# Patient Record
Sex: MALE | Race: WHITE | Employment: FULL TIME | URBAN - METROPOLITAN AREA
[De-identification: names, ages, dates, MRNs, and addresses within clinical notes are randomized per-mention and may not be internally consistent; named-entity substitution may affect disease eponyms.]

---

## 2018-01-02 ENCOUNTER — OFFICE VISIT (OUTPATIENT)
Dept: FAMILY MEDICINE CLINIC | Age: 48
End: 2018-01-02

## 2018-01-02 VITALS
RESPIRATION RATE: 16 BRPM | DIASTOLIC BLOOD PRESSURE: 82 MMHG | HEIGHT: 68 IN | OXYGEN SATURATION: 96 % | SYSTOLIC BLOOD PRESSURE: 114 MMHG | TEMPERATURE: 98.1 F | BODY MASS INDEX: 33.34 KG/M2 | WEIGHT: 220 LBS | HEART RATE: 99 BPM

## 2018-01-02 DIAGNOSIS — J06.9 UPPER RESPIRATORY INFECTION, ACUTE: ICD-10-CM

## 2018-01-02 DIAGNOSIS — H66.001 ACUTE SUPPURATIVE OTITIS MEDIA OF RIGHT EAR WITHOUT SPONTANEOUS RUPTURE OF TYMPANIC MEMBRANE, RECURRENCE NOT SPECIFIED: Primary | ICD-10-CM

## 2018-01-02 DIAGNOSIS — Z20.89 EXPOSURE TO PNEUMONIA: ICD-10-CM

## 2018-01-02 DIAGNOSIS — Z23 NEED FOR INFLUENZA VACCINATION: ICD-10-CM

## 2018-01-02 PROCEDURE — 90471 IMMUNIZATION ADMIN: CPT | Performed by: INTERNAL MEDICINE

## 2018-01-02 PROCEDURE — 99213 OFFICE O/P EST LOW 20 MIN: CPT | Performed by: INTERNAL MEDICINE

## 2018-01-02 PROCEDURE — 90688 IIV4 VACCINE SPLT 0.5 ML IM: CPT | Performed by: INTERNAL MEDICINE

## 2018-01-02 RX ORDER — AZITHROMYCIN 250 MG/1
TABLET, FILM COATED ORAL
Qty: 1 PACKET | Refills: 0 | Status: SHIPPED | OUTPATIENT
Start: 2018-01-02 | End: 2018-01-12

## 2018-01-02 NOTE — PATIENT INSTRUCTIONS
closely for changes in your health, and be sure to contact your doctor if:  ? · You have new or worse symptoms. ? · You are not getting better after taking an antibiotic for 2 days. Where can you learn more? Go to https://osbaldo.Driverdo. org and sign in to your [employer] account. Enter U961 in the [facility] box to learn more about \"Ear Infection (Otitis Media): Care Instructions. \"     If you do not have an account, please click on the \"Sign Up Now\" link. Current as of: May 12, 2017  Content Version: 11.4  © 3698-3421 [employer]. Care instructions adapted under license by [facility] ([facility]). If you have questions about a medical condition or this instruction, always ask your healthcare professional. [address] any warranty or liability for your use of this information. Patient Education        Upper Respiratory Infection (Cold): Care Instructions  Your Care Instructions    An upper respiratory infection, or URI, is an infection of the nose, sinuses, or throat. URIs are spread by coughs, sneezes, and direct contact. The common cold is the most frequent kind of URI. The flu and sinus infections are other kinds of URIs. Almost all URIs are caused by viruses. Antibiotics won't cure them. But you can treat most infections with home care. This may include drinking lots of fluids and taking over-the-counter pain medicine. You will probably feel better in 4 to 10 days. The doctor has checked you carefully, but problems can develop later. If you notice any problems or new symptoms, get medical treatment right away. Follow-up care is a key part of your treatment and safety. Be sure to make and go to all appointments, and call your doctor if you are having problems. It's also a good idea to know your test results and keep a list of the medicines you take. How can you care for yourself at home?   · To prevent dehydration, drink plenty of fluids, enough so

## 2018-01-02 NOTE — PROGRESS NOTES
11/15/2016    LABVLDL 30 03/23/2015    LABVLDL 51 02/11/2014       Old labs and records reviewed or requested  Discussed past lab and studies with patient     1. Acute suppurative otitis media of right ear without spontaneous rupture of tympanic membrane, recurrence not specified     2. Need for influenza vaccination  INFLUENZA, QUADV, 3 YRS AND OLDER, IM, MDV, 0.5ML (FLUZONE QUADV)   3. Upper respiratory infection, acute     4. Exposure to pneumonia       Exposure to pneumonia with probable infectious etiology for illness. Zithromax, prednisone. May use decongestant. Plenty of fluids. Follow-up if not improving      Follow-up when necessary      Diagnosis and treatment discussed.   Possible side effects of medication reviewed  Patients questions answered  Follow up understood  Pt aware if they are not contacted about any test results , this does not mean they are normal.  They should call

## 2019-04-02 ENCOUNTER — OFFICE VISIT (OUTPATIENT)
Dept: ORTHOPEDIC SURGERY | Age: 49
End: 2019-04-02
Payer: COMMERCIAL

## 2019-04-02 VITALS
DIASTOLIC BLOOD PRESSURE: 87 MMHG | WEIGHT: 220 LBS | HEIGHT: 68 IN | RESPIRATION RATE: 12 BRPM | BODY MASS INDEX: 33.34 KG/M2 | SYSTOLIC BLOOD PRESSURE: 135 MMHG | HEART RATE: 87 BPM

## 2019-04-02 DIAGNOSIS — S83.92XA SPRAIN OF LEFT KNEE, UNSPECIFIED LIGAMENT, INITIAL ENCOUNTER: ICD-10-CM

## 2019-04-02 DIAGNOSIS — M25.562 ACUTE PAIN OF LEFT KNEE: Primary | ICD-10-CM

## 2019-04-02 PROCEDURE — 99203 OFFICE O/P NEW LOW 30 MIN: CPT | Performed by: ORTHOPAEDIC SURGERY

## 2019-04-02 ASSESSMENT — ENCOUNTER SYMPTOMS
ALLERGIC/IMMUNOLOGIC NEGATIVE: 1
RESPIRATORY NEGATIVE: 1
GASTROINTESTINAL NEGATIVE: 1
EYES NEGATIVE: 1

## 2019-04-02 NOTE — PROGRESS NOTES
bilateral hips reveals normal flexion and extension. There is no restriction in rotation. There is no tenderness to palpation anteriorly posteriorly or laterally. Right knee examination demonstrates no effusion. There is no tenderness to palpation over the medial or lateral joint line. There is no discomfort over the patellar tendon. There is no palpable popliteal cyst.  Sensation is intact. Range of motion is normal.  There is no patellofemoral crepitation. There is no instability to varus or  valgus stress applied at 0, 30, 60, or 90° of flexion. There is no anterior or posterior drawer. Lachman examination is normal.    Examination has a trace effusion. He has intense tenderness over the medial femoral condyle. He has 1+ opening over the MCL with moderate pain. He has moderate medial joint line tenderness and pain with Mich's maneuver. Calf is soft. ACL and PCL and LCL are intact. He has severe pain at terminal extension and lacks about 5° of extension but flexes normally 235°. Left knee x-rays from [facility] demonstrate:No acute osseous process. Minimal osteoarthritic changes with trace knee joint effusion      Assessment:      Work-related left knee injury with differential diagnosis including medial collateral ligament injury, medial meniscus tear, or bone bruise      Plan:      Recommending an MRI scan. He continue to work on light duty limit of no more than 4 hours per day and must rest 15 minutes out of every hour. Follow-up with me after the scan. This note was created using voice recognition software. It has been proofread, but occasionally errors remain. Please disregard these errors. They will be corrected as they are noted.

## 2019-04-09 ENCOUNTER — OFFICE VISIT (OUTPATIENT)
Dept: ORTHOPEDIC SURGERY | Age: 49
End: 2019-04-09
Payer: COMMERCIAL

## 2019-04-09 VITALS
DIASTOLIC BLOOD PRESSURE: 90 MMHG | BODY MASS INDEX: 33.34 KG/M2 | HEIGHT: 68 IN | WEIGHT: 220 LBS | SYSTOLIC BLOOD PRESSURE: 129 MMHG | HEART RATE: 83 BPM | RESPIRATION RATE: 12 BRPM

## 2019-04-09 DIAGNOSIS — S83.412A SPRAIN OF MEDIAL COLLATERAL LIGAMENT OF LEFT KNEE, INITIAL ENCOUNTER: ICD-10-CM

## 2019-04-09 DIAGNOSIS — M25.562 ACUTE PAIN OF LEFT KNEE: Primary | ICD-10-CM

## 2019-04-09 PROCEDURE — 99213 OFFICE O/P EST LOW 20 MIN: CPT | Performed by: ORTHOPAEDIC SURGERY

## 2019-04-09 PROCEDURE — L1812 KO ELASTIC W/JOINTS PRE OTS: HCPCS | Performed by: ORTHOPAEDIC SURGERY

## 2019-04-09 RX ORDER — NAPROXEN 500 MG/1
500 TABLET ORAL 2 TIMES DAILY WITH MEALS
Qty: 60 TABLET | Refills: 2 | Status: SHIPPED | OUTPATIENT
Start: 2019-04-09 | End: 2019-07-08

## 2019-04-09 NOTE — PROGRESS NOTES
Kita Marcos turns today for his left knee. He's feeling okay this morning yesterday was having lots of pain. Today pain is 2 out of 10. Yesterday pain was 8 out of 10. His pain is typically medial.        Patient's medications, allergies, past medical, surgical, social and family histories were reviewed and updated as appropriate. Relevant review of systems reviewed. General Exam:    Vitals: Blood pressure (!) 129/90, pulse 83, resp. rate 12, height 5' 8\" (1.727 m), weight 220 lb (99.8 kg). Constitutional: Patient is adequately groomed with no evidence of malnutrition  Mental Status: The patient is oriented to time, place and person. The patient's mood and affect are appropriate. Left knee has tenderness over the proximal MCL. He has prominence over the kneecap but that's chronic. He has no intra-articular effusion today. He has no lateral sided pain. He has no gross instability. Range of motion is full. Left knee MRI is reviewed. It demonstrates:  CONCLUSION:   1. Moderate-grade (grade 2) sprain of the proximal aspect of the medial collateral ligament    (MCL) which is partially torn without full-thickness tear/rupture. 2. A 2.2cm in greatest diameter ossification is present along the anteroinferior aspect of the    patella within the proximal patellar tendon, possibly representing a chronic nonunited fracture    fragment or a fractured enthesophyte. Additionally, interposed within the ossification of the    anteroinferior aspect of the patella is tendinosis and probable partial-thickness interstitial    tearing of the adjacent proximal patellar tendon. 3. Small superficial partial-thickness chondral fissure/flap within the patellofemoral    compartment involving the lateral patellar facet adjacent to the patellar apex. 4. Intrasubstance degeneration within the medial and to a lesser degree the lateral meniscus    without evidence of discrete meniscal tear.    5. Small Baptiste's cyst. I reviewed these findings on the report and the images with the patient. Assessment: Work-related moderate grade 2 sprain of the MCL. His irregular ossification along the patella is chronic. That is not work-related. Assessment: Left knee MCL sprain. Plan: He'll go into a brace today that he needs to wear for work. Prescription for Naprosyn was provided as well. He will start therapy. He will work restrictions of 4 hours per day with a 15 minute rest break every hour for the next month. Follow-up with me at that time. This note was created using voice recognition software. It has been proofread, but occasionally errors remain. Please disregard these errors. They will be corrected as they are noted. Procedures    Breg Economy Hinged Knee WrapAround Brace     Patient was prescribed a Breg Economy Hinged Wrap Around Knee Brace. The left knee will require stabilization / immobilization from this semi-rigid / rigid orthosis to improve their function. The orthosis will assist in protecting the affected area, provide functional support and facilitate healing. The patient was educated and fit by a healthcare professional with expert knowledge and specialization in brace application while under the direct supervision of the treating physician. Verbal and written instructions for the use of and application of this item were provided. They were instructed to contact the office immediately should the brace result in increased pain, decreased sensation, increased swelling or worsening of the condition.

## 2019-04-09 NOTE — LETTER
[address] [place] [address] Road  [address] [place] [address] [place] [address]  Phone: [phone]  Fax: [phone]    Walter Lim MD        April 9, 2019     Patient: Rocael Son   YOB: 1970   Date of Visit: 4/9/2019       To Whom It May Concern: It is my medical opinion that Shanna Fleming may return to work on 04-09-19 with the following restrictions: can only work 4 hours per day. Must sit for 15 minutes out of every hour. If you have any questions or concerns, please don't hesitate to call.     Sincerely,        Walter Lim MD

## 2019-04-23 ENCOUNTER — TELEPHONE (OUTPATIENT)
Dept: ORTHOPEDIC SURGERY | Age: 49
End: 2019-04-23

## 2019-04-23 NOTE — TELEPHONE ENCOUNTER
Called patient and left message physical therapy has been approved by [facility]. Please call [phone] and schedule an appointment.

## 2019-05-01 ENCOUNTER — HOSPITAL ENCOUNTER (OUTPATIENT)
Dept: PHYSICAL THERAPY | Age: 49
Setting detail: THERAPIES SERIES
Discharge: HOME | End: 2019-05-01
Payer: COMMERCIAL

## 2019-05-01 PROCEDURE — 97110 THERAPEUTIC EXERCISES: CPT | Performed by: PHYSICAL THERAPIST

## 2019-05-01 PROCEDURE — 97530 THERAPEUTIC ACTIVITIES: CPT | Performed by: PHYSICAL THERAPIST

## 2019-05-01 PROCEDURE — 97161 PT EVAL LOW COMPLEX 20 MIN: CPT | Performed by: PHYSICAL THERAPIST

## 2019-05-01 NOTE — PLAN OF CARE
[address], [address] [place] [address], [address] [place] [address]     Phone: [phone]   Fax: [phone]                                                           Physical Therapy Certification    Dear Referring Practitioner: Sidney Mayers,    We had the pleasure of evaluating the following patient for physical therapy services at [address] [place] [address]. A summary of our findings can be found in the initial assessment below. This includes our plan of care. If you have any questions or concerns regarding these findings, please do not hesitate to contact me at the office phone number checked above. Thank you for the referral.       Physician Signature:_______________________________Date:__________________  By signing above (or electronic signature), therapists plan is approved by physician      Patient: Rocael Lloyd   : 1970   MRN: [id number]  Referring Physician: Referring Practitioner: Sidney Mayers      Evaluation Date: 2019      Medical Diagnosis Information:  Diagnosis: H78.186E (ICD-10-CM) - Sprain of medial collateral ligament of left knee, initial encounter   Treatment Diagnosis: S83.412A (ICD-10-CM) - Sprain of medial collateral ligament of left knee, initial encounter                                           Precautions/ Contra-indications: none  Latex Allergy:  ?NO      ? YES  Preferred Language for Healthcare:   ?English       ? other:    SUBJECTIVE: Patient stated complaint: Pt was going to the [employer] [employer], out of a dock. He hit the bumper of the truck, slipped off. His right leg landed on the ground and his left leg stayed on the dock. It was rainy and dark. This happened . He felt that he was just sore. He worked the rest of the day. It hurt pretty bad. First he went to Urgent Care ([facility] They sent him for an x-ray. He f/u and was then sent to Dr. Sidney Mayers.  He had an MRI done as well.   He then was sent to PT and put in a stabilizer brace. The brace has been helpful. When he is walking for a long period, it's hard to bend/sit. He has problems with prolonged sitting and then going to get up. He is working 4 hrs/d mostly with breaks in the hour. Occ he will work a full 6-7 hrs. Relevant Medical History:none. Allergic to codine. Functional Scale:   [id number]    Pain Scale: 2/10  Easing factors: pain at best 1-2/10. Brace, ice, naproxen  Provocative factors: pain at worst is 6-7/10 after long periods of time. Kneeling and getting down on the ground. Type: ?xConstant   ? Intermittent  ? Radiating ? Localized ? other:     Numbness/Tingling: none    Functional Limitations/Impairments: ?xSitting ? xStanding-3-4 hrs then goes to sit down ? Walking    ? xSquatting/bending (3-6/10)  ? xStairs  (2/10)         ? ADL's  ? Transfers ? xSports/Recreation- 2x/wk (18 on Sunday and 9 on Monday) ? Other:    Occupation/School: [employer]     Living Status/Prior Level of Function: Independent with ADLs and IADLs, golf 2x/wk (18 on Sunday and 9 on Monday)  (insert highest prior level of function)    OBJECTIVE:     Joint mobility:    ?xNormal    ?Hypo   ? Hyper    Palpation: -TTP    Functional Mobility/Transfers: see above    Posture: fwd head    Bandages/Dressings/Incisions: NA    Gait: (include devices/WB status) decreased swing ROM with brace on        Flexibility L R Comment   Hamstring      Gastroc      ITB      Quad                ROM PROM AROM Overpressure Comment    L R L R L R    Flexion   140 \"can feel it\" 143      Extension   lacking 2 0                              Strength L R Comment   Quad 4+ 5    Hamstring 4+ 5    Gastroc      Hip flexor 4 4    Hip ABD 4+ 4+                  Orthopedic Special Tests:   Special Test Results/Comment   Meniscal Click    Crepitus    Flexion Test    Valgus Laxity    Varus Laxity    Lachmans    Drop Back    Homans            Girth L R   Mid Patella     Suprapatellar with diagnosis of left knee sprain (MCL grade 2 per MRI) from the MD.  Clinically, the pt presents with decreased ROM, decreased strength, decreased function, and increased pain consistent with the MD diagnosis. The pt would benefit from skilled PT to return to PLOF. Pt is work comp and did just get visits approved. Pt does have more symptoms with more prolonged positions and tends to walk with decreased swing on his left. Pt's main limitations are being able to be on his feet for long periods of time and doing full work duty. Pt's benefits were reviewed. Pt's questions were answered. Pt was agreeable. Prognosis/Rehab Potential:      ?Excellent   ?xGood    ? Fair   ? Poor    Tolerance of evaluation/treatment:    ?Excellent   x? Good    ? Fair   ? Poor    PLAN  Frequency/Duration:  2 days per week for 6 Weeks:  Interventions:  ?  Therapeutic exercise including: strength training, ROM, for Lower extremity and core   ? NMR activation and proprioception for LE, Glutes and Core   ? Manual therapy as indicated for LE, Hip and spine to include: Dry Needling/IASTM, STM, PROM, Gr I-IV mobilizations, manipulation. ? Modalities as needed that may include: thermal agents, E-stim, Biofeedback, US, iontophoresis as indicated  ? Patient education on joint protection, postural re-education, activity modification, progression of HEP. HEP instruction: (see scanned forms)    GOALS:  Patient stated goal: no pain    Therapist goals for Patient:   Short Term Goals: To be achieved in: 2 weeks  1. Pt will be independent HEP and progression per patient tolerance, in order to prevent re-injury. 2. Patient will have a decrease in pain of 5/10 at worst to facilitate improvement in movement, function, and ADLs as indicated by functional deficits. Long Term Goals: To be achieved in: 6 weeks  1. Pt will demo a WOMAC score of 5% or better to assist with reaching prior level of function.    2. Patient will demonstrate increased AROM to knee flexion to greater than or equal to 140 and knee ext to 0 to allow for proper joint functioning as indicated by patients functional deficits. 3. Patient will demonstrate an increase in strength of hip flex, ABD, and knee flex/ext to 4+/5 to allow for proper functional mobility as indicated by patients functional deficits. 4. Patient will return to full work duty without pain. 5. Pt will return to playing golf without c/o pain. Physical Therapy Evaluation Complexity Justification  ? A history of present problem with:  ? no personal factors and/or comorbidities that impact the plan of care;  ?x1-2 personal factors and/or comorbidities that impact the plan of care  ? 3 personal factors and/or comorbidities that impact the plan of care  ? An examination of body systems using standardized tests and measures addressing any of the following: body structures and functions (impairments), activity limitations, and/or participation restrictions;:  ? a total of 1-2 or more elements   ? a total of 3 or more elements   ?x a total of 4 or more elements   ? A clinical presentation with:  ? xstable and/or uncomplicated characteristics   ? evolving clinical presentation with changing characteristics  ? unstable and unpredictable characteristics;   ? Clinical decision making of ?x low, ? moderate, ? high complexity using standardized patient assessment instrument and/or measurable assessment of functional outcome. ? xEVAL (LOW) 62185 (typically 20 minutes face-to-face)  ? EVAL (MOD) 19169 (typically 30 minutes face-to-face)  ? EVAL (HIGH) 75591 (typically 45 minutes face-to-face)  ?  Erwin Tamayo [id number]    Electronically signed by:  REENA Martines [id number]

## 2019-05-01 NOTE — FLOWSHEET NOTE
[address] [facility],[address], [facility] [address]                                                         Physical Therapy Daily Treatment Note  Date:  2019    Patient Name:  Bang Sahu    :  1970  MRN: [id number]    Medical/Treatment Diagnosis Information:  · Diagnosis: D09.240F (ICD-10-CM) - Sprain of medial collateral ligament of left knee, initial encounter. MCL grade 2- onset 2019  · Treatment Diagnosis: S83.412A (ICD-10-CM) - Sprain of medial collateral ligament of left knee, initial encounter  Insurance/Certification information:  PT Insurance Information: [facility]  Physician Information:  Referring Practitioner: Maria T Nugent of care signed (Y/N):     Date of Patient follow up with Physician: 7 May 2019    Functional Scale:  2019   WOMAC-24%    Progress Note: ?  Yes  ? No  Next due by: Visit #10  Or 29 May 2019     Latex Allergy:  ?NO      ? YES  Preferred Language for Healthcare:   ?English       ? other:    Visit # Insurance Allowable    through 19     Pain level:  See eval     SUBJECTIVE:  See eval    OBJECTIVE: See eval   Observation:    Test measurements:      Flexibility L R Comment   Hamstring      Gastroc      ITB      Quad                ROM PROM AROM Overpressure Comment    L R L R L R    Flexion          Extension   0 after prop stretch                               Strength L R Comment   Quad      Hamstring      Gastroc      Hip flexor      Hip ABD                      Special Test Results/Comment   Meniscal Click    Crepitus    Flexion Test    Valgus Laxity    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    Exercises/Interventions:     Exercise/Equipment Resistance/Repetitions Other comments   Stretching     Hamstring 5x:30 propped   Hip Flexion     ITB body weight control with ambulation re-education including up and down stairs     Home Exercise Program:    ? (01053) Reviewed/Progressed HEP activities related to strengthening, flexibility, endurance, ROM of core, proximal hip and LE for functional self-care, mobility, lifting and ambulation/stair navigation   ? (20021)Reviewed/Progressed HEP activities related to improving balance, coordination, kinesthetic sense, posture, motor skill, proprioception of core, proximal hip and LE for self care, mobility, lifting, and ambulation/stair navigation      Manual Treatments:  PROM / STM / Oscillations-Mobs:  G-I, II, III, IV (PA's, Inf., Post.)  ? (80039) Provided manual therapy to mobilize LE, proximal hip and/or LS spine soft tissue/joints for the purpose of modulating pain, promoting relaxation,  increasing ROM, reducing/eliminating soft tissue swelling/inflammation/restriction, improving soft tissue extensibility and allowing for proper ROM for normal function with self care, mobility, lifting and ambulation. Other:  Patient education on PT and plan of care including diagnosis, prognosis, treatment goals and options. Patient agrees with discussed POC and treatment and is aware of rehab process. Pt was also educated on clinic layout and use of modalities. PT gave pt business card to call if any questions. Modalities:  declined    Charges: [id number]  Timed Code Treatment Minutes: 23'   Total Treatment Minutes: 39'       ? xEVAL (LOW) 79891   ? EVAL (MOD) 30746   ? EVAL (HIGH) 59555   ? Marsa Griffes   ? GIZ(41726) x 1 [phone]   ? IONTO  ? NMR (21426) x     ? VASO  ? Manual (88487) x      ? Other:  ? xTA x   1 [id number]   ? [facility] [employer] (19904)  ? ES(attended) (99873)      ? ES (un) (63781):       GOALS:  Patient stated goal: no pain     Therapist goals for Patient:   Short Term Goals: To be achieved in: 2 weeks  1.  Pt will be independent HEP and progression per patient tolerance, in order to prevent re-injury. 2. Patient will have a decrease in pain of 5/10 at worst to facilitate improvement in movement, function, and ADLs as indicated by functional deficits.     Long Term Goals: To be achieved in: 6 weeks  1. Pt will demo a WOMAC score of 5% or better to assist with reaching prior level of function. 2. Patient will demonstrate increased AROM to knee flexion to greater than or equal to 140 and knee ext to 0 to allow for proper joint functioning as indicated by patients functional deficits. 3. Patient will demonstrate an increase in strength of hip flex, ABD, and knee flex/ext to 4+/5 to allow for proper functional mobility as indicated by patients functional deficits. 4. Patient will return to full work duty without pain. 5. Pt will return to playing golf without c/o pain. Progression Towards Functional goals:  ? Patient is progressing as expected towards functional goals listed. ? Progression is slowed due to complexities listed. ? Progression has been slowed due to co-morbidities. ? Plan just implemented, too soon to assess goals progression  ? Other:     ASSESSMENT:  See eval    Treatment/Activity Tolerance:  ? Patient tolerated treatment well ? Patient limited by fatigue  ? Patient limited by pain  ? Patient limited by other medical complications  ? Other: Pt is a 51 y/o male presenting with diagnosis of left knee sprain (MCL grade 2 per MRI) from the MD.  Clinically, the pt presents with decreased ROM, decreased strength, decreased function, and increased pain consistent with the MD diagnosis. The pt would benefit from skilled PT to return to PLOF. Pt is work comp and did just get visits approved. Pt does have more symptoms with more prolonged positions and tends to walk with decreased swing on his left. Pt's main limitations are being able to be on his feet for long periods of time and doing full work duty. Pt's benefits were reviewed. Pt's questions were answered.   Pt was agreeable. Prognosis: ? Good ? Fair  ? Poor    Patient Requires Follow-up: ? Yes  ? No    PLAN: See eval.  Consider bike, TKE, leg press, calf raises. ? Continue per plan of care ? Alter current plan (see comments)  ? Plan of care initiated ? Hold pending MD visit ?  Discharge    Electronically signed by: Chel Oreilly DPT [id number]

## 2019-05-07 ENCOUNTER — OFFICE VISIT (OUTPATIENT)
Dept: ORTHOPEDIC SURGERY | Age: 49
End: 2019-05-07
Payer: COMMERCIAL

## 2019-05-07 ENCOUNTER — HOSPITAL ENCOUNTER (OUTPATIENT)
Dept: PHYSICAL THERAPY | Age: 49
Setting detail: THERAPIES SERIES
Discharge: HOME | End: 2019-05-07
Payer: COMMERCIAL

## 2019-05-07 VITALS
BODY MASS INDEX: 33.34 KG/M2 | SYSTOLIC BLOOD PRESSURE: 136 MMHG | DIASTOLIC BLOOD PRESSURE: 95 MMHG | WEIGHT: 220 LBS | HEIGHT: 68 IN | HEART RATE: 79 BPM

## 2019-05-07 DIAGNOSIS — S83.412D SPRAIN OF MEDIAL COLLATERAL LIGAMENT OF LEFT KNEE, SUBSEQUENT ENCOUNTER: ICD-10-CM

## 2019-05-07 DIAGNOSIS — M25.562 ACUTE PAIN OF LEFT KNEE: Primary | ICD-10-CM

## 2019-05-07 PROCEDURE — 97110 THERAPEUTIC EXERCISES: CPT | Performed by: PHYSICAL THERAPIST

## 2019-05-07 PROCEDURE — 99212 OFFICE O/P EST SF 10 MIN: CPT | Performed by: ORTHOPAEDIC SURGERY

## 2019-05-07 PROCEDURE — 97530 THERAPEUTIC ACTIVITIES: CPT | Performed by: PHYSICAL THERAPIST

## 2019-05-07 NOTE — PROGRESS NOTES
Lori Mcneil returns today for his left knee. He gets occasional soreness but overall feels better. Pain if it occurs is only 1 out of 10. He has work fully for the last couple of days without difficulty. Patient's medications, allergies, past medical, surgical, social and family histories were reviewed and updated as appropriate. Relevant review of systems reviewed. General Exam:    Vitals: Blood pressure (!) 136/95, pulse 79, height 5' 8\" (1.727 m), weight 220 lb (99.8 kg). Constitutional: Patient is adequately groomed with no evidence of malnutrition  Mental Status: The patient is oriented to time, place and person. The patient's mood and affect are appropriate. Left knee examination demonstrates no effusion. There is no tenderness to palpation over the medial or lateral joint line. There is no discomfort over the patellar tendon. There is no palpable popliteal cyst.  Sensation is intact. Range of motion is normal.  There is no patellofemoral crepitation. There is no instability to varus or valgus stress applied at 0, 30, 60, or 90° of flexion. There is no anterior or posterior drawer. Lachman examination is normal.    I could not elicit discomfort today. Assessment: Resolved left MCL sprain. Plan: He should wear the brace for work and golfing activities for the next month. He can resume full duty work as of today. Follow-up with me on an as-needed basis. This note was created using voice recognition software. It has been proofread, but occasionally errors remain. Please disregard these errors. They will be corrected as they are noted.

## 2019-05-07 NOTE — LETTER
[address] [place] [id number]  Phone: [phone]  Fax: [phone]    Jed Sellers MD        May 7, 2019     Patient: Marito Damian   YOB: 1970   Date of Visit: 5/7/2019       To Whom It May Concern: It is my medical opinion that Tj Patel may return to work on 05-07-19 full duty without restrictions. .    If you have any questions or concerns, please don't hesitate to call.     Sincerely,        Jed Sellers MD

## 2019-05-07 NOTE — FLOWSHEET NOTE
[address] [facility],[address], [facility] [address]                                                         Physical Therapy Daily Treatment Note  Date:  2019    Patient Name:  Theresa Monterroso    :  1970  MRN: [id number]    Medical/Treatment Diagnosis Information:  · Diagnosis: R16.806K (ICD-10-CM) - Sprain of medial collateral ligament of left knee, initial encounter. MCL grade 2- onset 2019  · Treatment Diagnosis: S83.412A (ICD-10-CM) - Sprain of medial collateral ligament of left knee, initial encounter  Insurance/Certification information:  PT Insurance Information: [facility]  Physician Information:  Referring Practitioner: Haresh Garcias of care signed (Y/N):     Date of Patient follow up with Physician:  prn    Functional Scale:  2019   WOMAC-24%    Progress Note: ?  Yes  ?x  No  Next due by: Visit #10  Or 29 May 2019     Latex Allergy:  ?NO      ? YES  Preferred Language for Healthcare:   ?English       ? other:    Visit # Insurance Allowable   2  through 19     Pain level:  0/10    SUBJECTIVE:  He has no pain; however, it does get sore. He can kneel on it, but it gets sore. He does have some problems if he is on his feet for a while and then goes to sit down. It takes him about 10-15' to bend it and move it better, but every day it gets better. He did get done seeing the MD just now and was released for full work duty.       OBJECTIVE: See eval   Observation:    Test measurements:      Flexibility L R Comment   Hamstring      Gastroc      ITB      Quad                ROM PROM AROM Overpressure Comment    L R L R L R    Flexion          Extension                                  Strength L R Comment   Quad      Hamstring      Gastroc      Hip flexor      Hip ABD                      Special Test Results/Comment   Meniscal Click    Crepitus    Flexion Test    Valgus Laxity    Varus Laxity    Lachmans    Drop Back and progression per patient tolerance, in order to prevent re-injury. 2. Patient will have a decrease in pain of 5/10 at worst to facilitate improvement in movement, function, and ADLs as indicated by functional deficits.     Long Term Goals: To be achieved in: 6 weeks  1. Pt will demo a WOMAC score of 5% or better to assist with reaching prior level of function. 2. Patient will demonstrate increased AROM to knee flexion to greater than or equal to 140 and knee ext to 0 to allow for proper joint functioning as indicated by patients functional deficits. 3. Patient will demonstrate an increase in strength of hip flex, ABD, and knee flex/ext to 4+/5 to allow for proper functional mobility as indicated by patients functional deficits. 4. Patient will return to full work duty without pain. 5. Pt will return to playing golf without c/o pain. Progression Towards Functional goals:  ? Patient is progressing as expected towards functional goals listed. ? Progression is slowed due to complexities listed. ? Progression has been slowed due to co-morbidities. ? Plan just implemented, too soon to assess goals progression  ? Other:     ASSESSMENT:      Treatment/Activity Tolerance:  ? Patient tolerated treatment well ? Patient limited by fatigue  ? Patient limited by pain  ? Patient limited by other medical complications  ? Other: Pt brianne tx well. He does report fewer symptoms. He did struggle a little  With balance, but he denied pain otherwise t/o. He reports and demonstrates difficulty with SLR, particularly flexion. I did ask the pt to continue PT as I think he would continue to benefit from further strengthening. He is scheduled for later this week. If pt does not return, this note can suffice as a D/C. Prognosis: ? Good ? Fair  ? Poor    Patient Requires Follow-up: ? Yes  ? No    PLAN: Consider leg press, minisquats. ? Continue per plan of care ? Alter current plan (see comments)  ?

## 2019-05-09 ENCOUNTER — HOSPITAL ENCOUNTER (OUTPATIENT)
Dept: PHYSICAL THERAPY | Age: 49
Setting detail: THERAPIES SERIES
Discharge: HOME | End: 2019-05-09
Payer: COMMERCIAL

## 2019-05-09 PROCEDURE — 97110 THERAPEUTIC EXERCISES: CPT | Performed by: PHYSICAL THERAPIST

## 2019-05-09 PROCEDURE — 97112 NEUROMUSCULAR REEDUCATION: CPT | Performed by: PHYSICAL THERAPIST

## 2019-05-09 PROCEDURE — 97530 THERAPEUTIC ACTIVITIES: CPT | Performed by: PHYSICAL THERAPIST

## 2019-05-09 NOTE — FLOWSHEET NOTE
[place] , [facility] [address]                                                         Physical Therapy Daily Treatment Note  Date:  2019    Patient Name:  Chucky Choi    :  1970  MRN: [id number]    Medical/Treatment Diagnosis Information:  · Diagnosis: V64.209U (ICD-10-CM) - Sprain of medial collateral ligament of left knee, initial encounter. MCL grade 2- onset 2019  · Treatment Diagnosis: S83.412A (ICD-10-CM) - Sprain of medial collateral ligament of left knee, initial encounter  Insurance/Certification information:  PT Insurance Information: [facility]  Physician Information:  Referring Practitioner: Anita Rivera of care signed (Y/N):     Date of Patient follow up with Physician:  prn    Functional Scale:  2019   WOMAC-24%    Progress Note: ?  Yes  ?x  No  Next due by: Visit #10  Or 29 May 2019     Latex Allergy:  ?NO      ? YES  Preferred Language for Healthcare:   ?English       ? other:    Visit # Insurance Allowable   3  through 19     Pain level:  About 1/10    SUBJECTIVE:  He could feel it more getting in his car or with kneeling, but it's getting better overall.       OBJECTIVE: 19    Observation:    Test measurements:      Flexibility L R Comment   Hamstring      Gastroc      ITB      Quad                ROM PROM AROM Overpressure Comment    L R L R L R    Flexion   152       Extension   0                               Strength L R Comment   Quad      Hamstring      Gastroc      Hip flexor      Hip ABD                      Special Test Results/Comment   Meniscal Click    Crepitus    Flexion Test    Valgus Laxity    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    Exercises/Interventions:     Exercise/Equipment Resistance/Repetitions Other comments Stretching     Hamstring 5x:30 propped   Hip Flexion     ITB     Grion     Quad 5x:30    Inclined Calf 5x:30    Towel Pull          SLR     Supine 3x10 1#    Prone     Abduction 3x10 1#    Adducton 3x10 1#    SLR+ 3x:15    Clams 3x10 4#                   Isometrics     Quad sets     Hip Adduction     Hamstring                    Patellar Glides     Medial     Superior     Inferior          ROM     Sheet Pulls     Wall Slides     Edge of bed     Flexionator     Extensionator     Hang Weights     Ankle Pumps                              CKC     Calf raises 3x10 SL    Wall sits 5x:15    Step ups     Step up and over     Lateral Step Downs               Mini squats 3x10    CC TKE 3x10 5 pl         Lateral band walks     Side Planks     Half moon     Single leg flow          Biodex-balance     Single leg stance 5x:30    Plyoback          Stool scoots     SB bridge     SB HS Curls     Planks          PRE     Extension  RANGE: 90/40   Flexion 3x10 65# RANGE: 0/90        Cable Column          Leg Press 3x10 100# RANGE: 70/10        Bike 8' L5.5    Treadmill                     Therapeutic Exercise and NMR EXR  ? (00628) Provided verbal/tactile cueing for activities related to strengthening, flexibility, endurance, ROM for improvements in LE, proximal hip, and core control with self care, mobility, lifting, ambulation. ? (42017) Provided verbal/tactile cueing for activities related to improving balance, coordination, kinesthetic sense, posture, motor skill, proprioception  to assist with LE, proximal hip, and core control in self care, mobility, lifting, ambulation and eccentric single leg control.      NMR and Therapeutic Activities:    ? (84524 or 13374) Provided verbal/tactile cueing for activities related to improving balance, coordination, kinesthetic sense, posture, motor skill, proprioception and motor activation to allow for proper function of core, proximal hip and LE with self care and ADLs  ? (29272) Gait Re-education- Provided training and instruction to the patient for proper LE, core and proximal hip recruitment and positioning and eccentric body weight control with ambulation re-education including up and down stairs     Home Exercise Program:    ? (24564) Reviewed/Progressed HEP activities related to strengthening, flexibility, endurance, ROM of core, proximal hip and LE for functional self-care, mobility, lifting and ambulation/stair navigation   ? (22359)Reviewed/Progressed HEP activities related to improving balance, coordination, kinesthetic sense, posture, motor skill, proprioception of core, proximal hip and LE for self care, mobility, lifting, and ambulation/stair navigation      Manual Treatments:  PROM / STM / Oscillations-Mobs:  G-I, II, III, IV (PA's, Inf., Post.)  ? (74261) Provided manual therapy to mobilize LE, proximal hip and/or LS spine soft tissue/joints for the purpose of modulating pain, promoting relaxation,  increasing ROM, reducing/eliminating soft tissue swelling/inflammation/restriction, improving soft tissue extensibility and allowing for proper ROM for normal function with self care, mobility, lifting and ambulation. Other:        Modalities:  declined    Charges:  [id number]  Timed Code Treatment Minutes: 47'   Total Treatment Minutes: 47'       ? EVAL (LOW) 51431   ? EVAL (MOD) 34091   ? EVAL (HIGH) 62816   ? RE-EVAL   ? [id number])G4 (7583-0398-55')   ? IONTO  ? NMR (27258) x  (6394-3587- 8')   ? VASO  ? Manual (20641) x      ? Other:  ? xTA x   1 (4712-1295-09')   ? [facility] Traction (39189)  ? ES(attended) (70538)      ? ES (un) (45789):       GOALS:  Patient stated goal: no pain     Therapist goals for Patient:   Short Term Goals: To be achieved in: 2 weeks  1. Pt will be independent HEP and progression per patient tolerance, in order to prevent re-injury.    2. Patient will have a decrease in pain of 5/10 at worst to facilitate improvement in movement, function, and ADLs as indicated by functional deficits.     Long Term Goals: To be achieved in: 6 weeks  1. Pt will demo a WOMAC score of 5% or better to assist with reaching prior level of function. 2. Patient will demonstrate increased AROM to knee flexion to greater than or equal to 140 and knee ext to 0 to allow for proper joint functioning as indicated by patients functional deficits. 3. Patient will demonstrate an increase in strength of hip flex, ABD, and knee flex/ext to 4+/5 to allow for proper functional mobility as indicated by patients functional deficits. 4. Patient will return to full work duty without pain. 5. Pt will return to playing golf without c/o pain. Progression Towards Functional goals:  ? Patient is progressing as expected towards functional goals listed. ? Progression is slowed due to complexities listed. ? Progression has been slowed due to co-morbidities. ? Plan just implemented, too soon to assess goals progression  ? Other:     ASSESSMENT:      Treatment/Activity Tolerance:  ? Patient tolerated treatment well ? Patient limited by fatigue  ? Patient limited by pain  ? Patient limited by other medical complications  ? Other: Pt brianne tx well. Pt is challenged by the wall sits, balance, and SLR+, which demonstrates some difficulty with endurance. Pt does demo good comprehension of HEP. Pt was able to brianne progressions without c/o knee pain. Pt would continue to benefit from skilled PT to improve strength and endurance in order to return to PLOF. If pt does not return, this note can suffice as a D/C. Prognosis: ? Good ? Fair  ? Poor    Patient Requires Follow-up: ? Yes  ? No    PLAN: Consider SL leg press, knee ext. ? Continue per plan of care ? Alter current plan (see comments)  ? Plan of care initiated ? Hold pending MD visit ?  Discharge    Electronically signed by: Dianne Downing, REENA [id number]

## 2019-05-14 ENCOUNTER — HOSPITAL ENCOUNTER (OUTPATIENT)
Dept: PHYSICAL THERAPY | Age: 49
Setting detail: THERAPIES SERIES
Discharge: HOME | End: 2019-05-14
Payer: COMMERCIAL

## 2019-05-14 PROCEDURE — 97530 THERAPEUTIC ACTIVITIES: CPT | Performed by: PHYSICAL THERAPIST

## 2019-05-14 PROCEDURE — 97110 THERAPEUTIC EXERCISES: CPT | Performed by: PHYSICAL THERAPIST

## 2019-05-14 NOTE — FLOWSHEET NOTE
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    Exercises/Interventions:     Exercise/Equipment Resistance/Repetitions Other comments   Stretching     Hamstring 5x:30    Hip Flexion     ITB     Grion     Quad 5x:30    Inclined Calf 5x:30    Towel Pull          SLR     Supine 3x10 2#    Prone     Abduction 3x10 2#    Adducton 3x10 2#    SLR+ 3x:15    Clams 3x10 4#                   Isometrics     Quad sets     Hip Adduction     Hamstring                    Patellar Glides     Medial     Superior     Inferior          ROM     Sheet Pulls     Wall Slides     Edge of bed     Flexionator     Extensionator     Hang Weights     Ankle Pumps                              CKC     Calf raises 3x10 SL    Wall sits 5x:30    Step ups     Step up and over     Lateral Step Downs               Mini squats 3x10    CC TKE 3x10 5 pl         Lateral band walks     Side Planks     Half moon     Single leg flow          Biodex-balance     Single leg stance 5x:30    Plyoback          Stool scoots     SB bridge     SB HS Curls     Planks          PRE     Extension  RANGE: 90/40   Flexion 3x10 30# RANGE: 0/90        Cable Column          Leg Press 3x10 60# SL RANGE: 70/10        Bike 8' L6    Treadmill                     Therapeutic Exercise and NMR EXR  ? (78200) Provided verbal/tactile cueing for activities related to strengthening, flexibility, endurance, ROM for improvements in LE, proximal hip, and core control with self care, mobility, lifting, ambulation. ? (60427) Provided verbal/tactile cueing for activities related to improving balance, coordination, kinesthetic sense, posture, motor skill, proprioception  to assist with LE, proximal hip, and core control in self care, mobility, lifting, ambulation and eccentric single leg control.      NMR and Therapeutic Activities:    ? (30174 or ) Provided verbal/tactile cueing for activities related to improving balance, coordination, kinesthetic sense, posture, motor skill, proprioception and motor activation to allow for proper function of core, proximal hip and LE with self care and ADLs  ? (52563) Gait Re-education- Provided training and instruction to the patient for proper LE, core and proximal hip recruitment and positioning and eccentric body weight control with ambulation re-education including up and down stairs     Home Exercise Program:    ? (87988) Reviewed/Progressed HEP activities related to strengthening, flexibility, endurance, ROM of core, proximal hip and LE for functional self-care, mobility, lifting and ambulation/stair navigation   ? (68587)Reviewed/Progressed HEP activities related to improving balance, coordination, kinesthetic sense, posture, motor skill, proprioception of core, proximal hip and LE for self care, mobility, lifting, and ambulation/stair navigation      Manual Treatments:  PROM / STM / Oscillations-Mobs:  G-I, II, III, IV (PA's, Inf., Post.)  ? (70338) Provided manual therapy to mobilize LE, proximal hip and/or LS spine soft tissue/joints for the purpose of modulating pain, promoting relaxation,  increasing ROM, reducing/eliminating soft tissue swelling/inflammation/restriction, improving soft tissue extensibility and allowing for proper ROM for normal function with self care, mobility, lifting and ambulation. Other:        Modalities:  declined    Charges:  [id number]  Timed Code Treatment Minutes: 48'   Total Treatment Minutes: 48'       ? EVAL (LOW) 92468   ? EVAL (MOD) 30165   ? EVAL (HIGH) 95738   ? RE-EVAL   ? [facility]([id number])E3 (9816-5918-11')   ? IONTO  ? NMR (52765) x   ? VASO  ? Manual (66828) x      ? Other:  ? xTA x   1 (6272-3518-31')   ? [facility] Traction (82190)  ? ES(attended) (53659)      ? ES (un) (21998):       GOALS:  Patient stated goal: no pain     Therapist goals for Patient:   Short Term Goals:  To be achieved in: 2 weeks  1. Pt will be independent HEP and progression per patient tolerance, in order to prevent re-injury. 2. Patient will have a decrease in pain of 5/10 at worst to facilitate improvement in movement, function, and ADLs as indicated by functional deficits.     Long Term Goals: To be achieved in: 6 weeks  1. Pt will demo a WOMAC score of 5% or better to assist with reaching prior level of function. 2. Patient will demonstrate increased AROM to knee flexion to greater than or equal to 140 and knee ext to 0 to allow for proper joint functioning as indicated by patients functional deficits. 3. Patient will demonstrate an increase in strength of hip flex, ABD, and knee flex/ext to 4+/5 to allow for proper functional mobility as indicated by patients functional deficits. 4. Patient will return to full work duty without pain. 5. Pt will return to playing golf without c/o pain. Progression Towards Functional goals:  ? Patient is progressing as expected towards functional goals listed. ? Progression is slowed due to complexities listed. ? Progression has been slowed due to co-morbidities. ? Plan just implemented, too soon to assess goals progression  ? Other:     ASSESSMENT:      Treatment/Activity Tolerance:  ? Patient tolerated treatment well ? Patient limited by fatigue  ? Patient limited by pain  ? Patient limited by other medical complications  ? Other: Pt brianne tx well. Pt is making good progress overall. Towards the end of his tx, pt did report some knee pain, but this quickly went away after SLR+. He continues to demo decreased strength/endurance. He was able to play golf without problems though. Pt would continue to benefit from skilled PT to improve strength and function. If pt does not return, this note can suffice as a D/C. Prognosis: ? Good ? Fair  ? Poor    Patient Requires Follow-up: ? Yes  ? No    PLAN: Consider knee ext. ? Continue per plan of care ? Alter current plan (see comments)  ? Plan of care initiated ? Hold pending MD visit ?  Discharge    Electronically signed by: Chel Oreilly DPT [id number]

## 2019-05-16 ENCOUNTER — HOSPITAL ENCOUNTER (OUTPATIENT)
Dept: PHYSICAL THERAPY | Age: 49
Setting detail: THERAPIES SERIES
Discharge: HOME | End: 2019-05-16
Payer: COMMERCIAL

## 2019-05-16 PROCEDURE — 97530 THERAPEUTIC ACTIVITIES: CPT | Performed by: PHYSICAL THERAPIST

## 2019-05-16 PROCEDURE — 97110 THERAPEUTIC EXERCISES: CPT | Performed by: PHYSICAL THERAPIST

## 2019-05-16 NOTE — FLOWSHEET NOTE
[place] , [facility] DR JOSR LYLES                                                         Physical Therapy Daily Treatment Note  Date:  2019    Patient Name:  Ming Mendoza    :  1970  MRN: [id number]    Medical/Treatment Diagnosis Information:  · Diagnosis: H13.537G (ICD-10-CM) - Sprain of medial collateral ligament of left knee, initial encounter. MCL grade 2- onset 2019  · Treatment Diagnosis: S83.412A (ICD-10-CM) - Sprain of medial collateral ligament of left knee, initial encounter  Insurance/Certification information:  PT Insurance Information: [facility]  Physician Information:  Referring Practitioner: Luis Felipe Dodd signed (Y/N):     Date of Patient follow up with Physician:  prn    Functional Scale:  2019   WOMAC-24%    Progress Note: ?  Yes  ?x  No  Next due by: Visit #10  Or 29 May 2019     Latex Allergy:  ?NO      ? YES  Preferred Language for Healthcare:   ?English       ? other:    Visit # Insurance Allowable    through 19     Pain level:  0/10    SUBJECTIVE:  He had some pain yesterday with cutting the grass if he went too quickly. He was fine when he would walk slowly. However, he also did play 18 holes of golf that day and was busy the whole day.       OBJECTIVE: 19    Observation:    Test measurements:      Flexibility L R Comment   Hamstring      Gastroc      ITB      Quad                ROM PROM AROM Overpressure Comment    L R L R L R    Flexion   142       Extension   0                               Strength L R Comment   Quad      Hamstring      Gastroc      Hip flexor      Hip ABD                      Special Test Results/Comment   Meniscal Click    Crepitus    Flexion Test    Valgus Laxity    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    Exercises/Interventions:     Exercise/Equipment Resistance/Repetitions Other comments   Stretching     Hamstring 5x:30    Hip Flexion     ITB     Grion     Quad 5x:30    Inclined Calf 5x:30    Towel Pull          SLR     Supine 3x10 2#    Prone     Abduction 3x10 2#    Adducton 3x10 2#    SLR+     Clams 3x10 5#                   Isometrics     Quad sets     Hip Adduction     Hamstring                    Patellar Glides     Medial     Superior     Inferior          ROM     Sheet Pulls     Wall Slides     Edge of bed     Flexionator     Extensionator     Hang Weights     Ankle Pumps                              CKC     Calf raises 3x10 SL    Wall sits 5x:30    Step ups     Step up and over     Lateral Step Downs               Mini squats 3x10    CC TKE 3x10 5 pl         Lateral band walks     Side Planks     Half moon     Single leg flow          Biodex-balance     Single leg stance 5x:30    Plyoback          Stool scoots     SB bridge     SB HS Curls     Planks          PRE     Extension 3x10 35# BLE RANGE: 90/40   Flexion 3x10 35# SL RANGE: 0/90        Cable Column          Leg Press 3x10 70# SL RANGE: 70/10        Bike 8' L6    Treadmill                     Therapeutic Exercise and NMR EXR  ? (47593) Provided verbal/tactile cueing for activities related to strengthening, flexibility, endurance, ROM for improvements in LE, proximal hip, and core control with self care, mobility, lifting, ambulation. ? (34395) Provided verbal/tactile cueing for activities related to improving balance, coordination, kinesthetic sense, posture, motor skill, proprioception  to assist with LE, proximal hip, and core control in self care, mobility, lifting, ambulation and eccentric single leg control.      NMR and Therapeutic Activities:    ? (29592 or 91332) Provided verbal/tactile cueing for activities related to improving balance, coordination, kinesthetic sense, posture, motor skill, proprioception and motor activation to allow for proper function of core, proximal hip and LE with self care and ADLs  ? (91920) Gait Re-education- Provided training and instruction to the patient for proper LE, core and proximal hip recruitment and positioning and eccentric body weight control with ambulation re-education including up and down stairs     Home Exercise Program:    ? (92589) Reviewed/Progressed HEP activities related to strengthening, flexibility, endurance, ROM of core, proximal hip and LE for functional self-care, mobility, lifting and ambulation/stair navigation   ? (50852)Reviewed/Progressed HEP activities related to improving balance, coordination, kinesthetic sense, posture, motor skill, proprioception of core, proximal hip and LE for self care, mobility, lifting, and ambulation/stair navigation      Manual Treatments:  PROM / STM / Oscillations-Mobs:  G-I, II, III, IV (PA's, Inf., Post.)  ? (51540) Provided manual therapy to mobilize LE, proximal hip and/or LS spine soft tissue/joints for the purpose of modulating pain, promoting relaxation,  increasing ROM, reducing/eliminating soft tissue swelling/inflammation/restriction, improving soft tissue extensibility and allowing for proper ROM for normal function with self care, mobility, lifting and ambulation. Other:        Modalities:  declined    Charges:  2245-2203  Timed Code Treatment Minutes: 48'   Total Treatment Minutes: 54'       ? EVAL (LOW) 32839   ? EVAL (MOD) 18440   ? EVAL (HIGH) 05572   ? RE-EVAL   ? QOU(01972)J3 (3706-8385-35')   ? IONTO  ? NMR (36954) x   ? VASO  ? Manual (73285) x      ? Other:  ? xTA x   1 (8898-5277-30')   ? [facility] Traction (03096)  ? ES(attended) (63307)      ? ES (un) (26229):       GOALS:  Patient stated goal: no pain     Therapist goals for Patient:   Short Term Goals: To be achieved in: 2 weeks  1. Pt will be independent HEP and progression per patient tolerance, in order to prevent re-injury.    2. Patient will have a decrease in pain of

## 2019-05-21 ENCOUNTER — HOSPITAL ENCOUNTER (OUTPATIENT)
Dept: PHYSICAL THERAPY | Age: 49
Setting detail: THERAPIES SERIES
Discharge: HOME | End: 2019-05-21
Payer: COMMERCIAL

## 2019-05-21 PROCEDURE — 97110 THERAPEUTIC EXERCISES: CPT | Performed by: PHYSICAL THERAPIST

## 2019-05-21 PROCEDURE — 97530 THERAPEUTIC ACTIVITIES: CPT | Performed by: PHYSICAL THERAPIST

## 2019-05-21 NOTE — FLOWSHEET NOTE
[place] , [facility] DR JOSR LYLES                                                         Physical Therapy Daily Treatment Note  Date:  2019    Patient Name:  Ming Mendoza    :  1970  MRN: [id number]    Medical/Treatment Diagnosis Information:  · Diagnosis: Z95.354I (ICD-10-CM) - Sprain of medial collateral ligament of left knee, initial encounter. MCL grade 2- onset 2019  · Treatment Diagnosis: S83.412A (ICD-10-CM) - Sprain of medial collateral ligament of left knee, initial encounter  Insurance/Certification information:  PT Insurance Information: [facility]  Physician Information:  Referring Practitioner: Luis Felipe Dodd signed (Y/N):     Date of Patient follow up with Physician:  prn    Functional Scale:  2019   WOMAC-24%    Progress Note: ?  Yes  ?x  No  Next due by: Visit #10  Or 29 May 2019     Latex Allergy:  ?NO      ? YES  Preferred Language for Healthcare:   ?English       ? other:    Visit # Insurance Allowable    through 19     Pain level:  1/10    SUBJECTIVE:  He had a lot of pain on Saturday and into  morning. His pain was about 2/10 on Saturday and he wasn't doing anything. He didn't have to go to work. Thus, he decided to sit at home and relax. His pain got worse, and when he woke up on , his pain was worse and 3/10. He states he felt like when he hurt it. He feels better if he is up and moving like at work. He does have pain with sit to stand and after he sits for a while.       OBJECTIVE: 19    Observation:    Test measurements:      Flexibility L R Comment   Hamstring      Gastroc      ITB      Quad                ROM PROM AROM Overpressure Comment    L R L R L R    Flexion   131       Extension   0                               Strength L R Comment   Quad      Hamstring      Gastroc      Hip flexor      Hip ABD                      Special Test Results/Comment   Meniscal Click    Crepitus    Flexion Test    Valgus Laxity    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    Exercises/Interventions:     Exercise/Equipment Resistance/Repetitions Other comments   Stretching     Hamstring 5x:30    Hip Flexion     ITB     Grion     Quad 5x:30    Inclined Calf 5x:30    Towel Pull          SLR     Supine 3x10 2#    Prone     Abduction 3x10 2#    Adducton 3x10 2#    SLR+ 3x:15    Clams 3x10 5#                   Isometrics     Quad sets     Hip Adduction     Hamstring                    Patellar Glides     Medial     Superior     Inferior          ROM     Sheet Pulls     Wall Slides     Edge of bed     Flexionator     Extensionator     Hang Weights     Ankle Pumps                              CKC     Calf raises 3x10 SL    Wall sits 5x:30    Step ups     Step up and over     Lateral Step Downs               Mini squats 3x10    CC TKE 3x10 5 pl         Lateral band walks     Side Planks     Half moon     Single leg flow          Biodex-balance     Single leg stance 5x:30    Plyoback          Stool scoots     SB bridge     SB HS Curls     Planks          PRE     Extension 3x10 35# BLE RANGE: 90/40   Flexion 3x10 35# SL RANGE: 0/90        Cable Column          Leg Press 3x10 70# SL RANGE: 70/10        Bike 8' L6    Treadmill                     Therapeutic Exercise and NMR EXR  ? (07961) Provided verbal/tactile cueing for activities related to strengthening, flexibility, endurance, ROM for improvements in LE, proximal hip, and core control with self care, mobility, lifting, ambulation.   ? (62004) Provided verbal/tactile cueing for activities related to improving balance, coordination, kinesthetic sense, posture, motor skill, proprioception  to assist with LE, proximal hip, and core control in self care, mobility, lifting, ambulation and eccentric single leg control. NMR and Therapeutic Activities:    ? (35252 or 48130) Provided verbal/tactile cueing for activities related to improving balance, coordination, kinesthetic sense, posture, motor skill, proprioception and motor activation to allow for proper function of core, proximal hip and LE with self care and ADLs  ? (82598) Gait Re-education- Provided training and instruction to the patient for proper LE, core and proximal hip recruitment and positioning and eccentric body weight control with ambulation re-education including up and down stairs     Home Exercise Program:    ? (29842) Reviewed/Progressed HEP activities related to strengthening, flexibility, endurance, ROM of core, proximal hip and LE for functional self-care, mobility, lifting and ambulation/stair navigation   ? (61201)Reviewed/Progressed HEP activities related to improving balance, coordination, kinesthetic sense, posture, motor skill, proprioception of core, proximal hip and LE for self care, mobility, lifting, and ambulation/stair navigation      Manual Treatments:  PROM / STM / Oscillations-Mobs:  G-I, II, III, IV (PA's, Inf., Post.)  ? (81606) Provided manual therapy to mobilize LE, proximal hip and/or LS spine soft tissue/joints for the purpose of modulating pain, promoting relaxation,  increasing ROM, reducing/eliminating soft tissue swelling/inflammation/restriction, improving soft tissue extensibility and allowing for proper ROM for normal function with self care, mobility, lifting and ambulation. Other:        Modalities:  declined    Charges:  [id number]  Timed Code Treatment Minutes: 45'   Total Treatment Minutes: 61'       ? EVAL (LOW) 01572   ? EVAL (MOD) 52160   ? EVAL (HIGH) 00841   ? RE-EVAL   ? XGC(68024)W1 (3537-4256-29')   ? IONTO  ? NMR (76128) x   ? VASO  ? Manual (11104) x      ? Other:  ? xTA x   1 (8923-7160-4')   ? [facility] Traction (17195)  ? ES(attended) (29297)      ?  ES (un) (67084): GOALS:  Patient stated goal: no pain     Therapist goals for Patient:   Short Term Goals: To be achieved in: 2 weeks  1. Pt will be independent HEP and progression per patient tolerance, in order to prevent re-injury. 2. Patient will have a decrease in pain of 5/10 at worst to facilitate improvement in movement, function, and ADLs as indicated by functional deficits.     Long Term Goals: To be achieved in: 6 weeks  1. Pt will demo a WOMAC score of 5% or better to assist with reaching prior level of function. 2. Patient will demonstrate increased AROM to knee flexion to greater than or equal to 140 and knee ext to 0 to allow for proper joint functioning as indicated by patients functional deficits. 3. Patient will demonstrate an increase in strength of hip flex, ABD, and knee flex/ext to 4+/5 to allow for proper functional mobility as indicated by patients functional deficits. 4. Patient will return to full work duty without pain. 5. Pt will return to playing golf without c/o pain. Progression Towards Functional goals:  ? Patient is progressing as expected towards functional goals listed. ? Progression is slowed due to complexities listed. ? Progression has been slowed due to co-morbidities. ? Plan just implemented, too soon to assess goals progression  ? Other:     ASSESSMENT:      Treatment/Activity Tolerance:  ? Patient tolerated treatment well ? Patient limited by fatigue  ? Patient limited by pain  ? Patient limited by other medical complications  ? Other: Pt brianne tx well. We did discuss how it is not uncommon to have some discomfort. We discussed that the pt should continue the brace use while working, mowing lawn, playing golf, or uncomfortable. I stressed the use of ice, especially if havig pain. We discussed that his knee will feel better if he moves a bit, but he will have to be cautious not to overdo it.   Pt would continue to benefit from skilled PT to improve strength, endurance, and return to PLOF. If pt does not return, this note can suffice as a D/C. Prognosis: ? Good ? Fair  ? Poor    Patient Requires Follow-up: ? Yes  ? No    PLAN: Progress strengthening per pt brianne.   ? Continue per plan of care ? Alter current plan (see comments)  ? Plan of care initiated ? Hold pending MD visit ?  Discharge    Electronically signed by: Gurjit Edgar DPT [id number]

## 2019-05-23 ENCOUNTER — HOSPITAL ENCOUNTER (OUTPATIENT)
Dept: PHYSICAL THERAPY | Age: 49
Setting detail: THERAPIES SERIES
Discharge: HOME | End: 2019-05-23
Payer: COMMERCIAL

## 2019-05-23 PROCEDURE — 97110 THERAPEUTIC EXERCISES: CPT | Performed by: PHYSICAL THERAPIST

## 2019-05-23 PROCEDURE — 97530 THERAPEUTIC ACTIVITIES: CPT | Performed by: PHYSICAL THERAPIST

## 2019-05-23 NOTE — FLOWSHEET NOTE
[place] , [facility] DR JOSR LYLES                                                         Physical Therapy Daily Treatment Note  Date:  2019    Patient Name:  Lynn Aguila    :  1970  MRN: [id number]    Medical/Treatment Diagnosis Information:  · Diagnosis: Y73.429B (ICD-10-CM) - Sprain of medial collateral ligament of left knee, initial encounter. MCL grade 2- onset 2019  · Treatment Diagnosis: S83.412A (ICD-10-CM) - Sprain of medial collateral ligament of left knee, initial encounter  Insurance/Certification information:  PT Insurance Information: [id number]  Physician Information:  Referring Practitioner: Lacey Precise of care signed (Y/N):     Date of Patient follow up with Physician:  prn    Functional Scale:  2019   WOMAC-24%    Progress Note: ?  Yes  ?x  No  Next due by: Visit #10  Or 29 May 2019     Latex Allergy:  ?NO      ? YES  Preferred Language for Healthcare:   ?English       ? other:    Visit # Insurance Allowable   7  through 19     Pain level:  0.5/10    SUBJECTIVE:  Last night he only got about 2 hrs sleep. He had a lot of pain, but then when he got up and until now, his pain has been good. He did cut the grass, but he wore his brace and iced afterwards.       OBJECTIVE: 19    Observation:    Test measurements:      Flexibility L R Comment   Hamstring      Gastroc      ITB      Quad                ROM PROM AROM Overpressure Comment    L R L R L R    Flexion   131       Extension   0                               Strength L R Comment   Quad      Hamstring      Gastroc      Hip flexor      Hip ABD                      Special Test Results/Comment   Meniscal Click    Crepitus    Flexion Test    Valgus Laxity    Varus Laxity    Lachmans    Drop Back    Homans            Girth L R   Mid Patella     Suprapatellar     5cm above     15cm above             RESTRICTIONS/PRECAUTIONS: allergic skill, proprioception and motor activation to allow for proper function of core, proximal hip and LE with self care and ADLs  ? (10754) Gait Re-education- Provided training and instruction to the patient for proper LE, core and proximal hip recruitment and positioning and eccentric body weight control with ambulation re-education including up and down stairs     Home Exercise Program:    ? (59111) Reviewed/Progressed HEP activities related to strengthening, flexibility, endurance, ROM of core, proximal hip and LE for functional self-care, mobility, lifting and ambulation/stair navigation   ? (15379)Reviewed/Progressed HEP activities related to improving balance, coordination, kinesthetic sense, posture, motor skill, proprioception of core, proximal hip and LE for self care, mobility, lifting, and ambulation/stair navigation      Manual Treatments:  PROM / STM / Oscillations-Mobs:  G-I, II, III, IV (PA's, Inf., Post.)  ? (16219) Provided manual therapy to mobilize LE, proximal hip and/or LS spine soft tissue/joints for the purpose of modulating pain, promoting relaxation,  increasing ROM, reducing/eliminating soft tissue swelling/inflammation/restriction, improving soft tissue extensibility and allowing for proper ROM for normal function with self care, mobility, lifting and ambulation. Other:        Modalities:  declined    Charges:  [id number]  Timed Code Treatment Minutes: 36'   Total Treatment Minutes: 54'       ? EVAL (LOW) 04108   ? EVAL (MOD) 93147   ? EVAL (HIGH) 25433   ? RE-EVAL   ? OXC(01518)O3 (5558-1297-64')   ? IONTO  ? NMR (52398) x   ? VASO  ? Manual (97962) x      ? Other:  ? xTA x   1 (3915-6077-32')   ? [facility] Traction (57145)  ? ES(attended) (89760)      ? ES (un) (83824):       GOALS:  Patient stated goal: no pain     Therapist goals for Patient:   Short Term Goals: To be achieved in: 2 weeks  1. Pt will be independent HEP and progression per patient tolerance, in order to prevent re-injury. 2. Patient will have a decrease in pain of 5/10 at worst to facilitate improvement in movement, function, and ADLs as indicated by functional deficits.     Long Term Goals: To be achieved in: 6 weeks  1. Pt will demo a WOMAC score of 5% or better to assist with reaching prior level of function. 2. Patient will demonstrate increased AROM to knee flexion to greater than or equal to 140 and knee ext to 0 to allow for proper joint functioning as indicated by patients functional deficits. 3. Patient will demonstrate an increase in strength of hip flex, ABD, and knee flex/ext to 4+/5 to allow for proper functional mobility as indicated by patients functional deficits. 4. Patient will return to full work duty without pain. 5. Pt will return to playing golf without c/o pain. Progression Towards Functional goals:  ? Patient is progressing as expected towards functional goals listed. ? Progression is slowed due to complexities listed. ? Progression has been slowed due to co-morbidities. ? Plan just implemented, too soon to assess goals progression  ? Other:     ASSESSMENT:      Treatment/Activity Tolerance:  ? Patient tolerated treatment well ? Patient limited by fatigue  ? Patient limited by pain  ? Patient limited by other medical complications  ? Other: Pt brianne tx well. Unfortunately, pt did have more pain last night for some reason. Pt is improving on SLS. Pt does appear to be making gains in strength, but he is slightly hesitant to progress. Pt will benefit from continued skilled PT to improve strength and function. If pt does not return, this note can suffice as a D/C. Prognosis: ? Good ? Fair  ? Poor    Patient Requires Follow-up: ? Yes  ? No    PLAN: Pt's visits  next week. Pt is anticipated to be weaned to HEP at this point, but we did discuss that the pt could return to MD if needed. ? Continue per plan of care ? Alter current plan (see comments)  ?  Plan of care

## 2019-05-28 ENCOUNTER — HOSPITAL ENCOUNTER (OUTPATIENT)
Dept: PHYSICAL THERAPY | Age: 49
Setting detail: THERAPIES SERIES
Discharge: HOME | End: 2019-05-28
Payer: COMMERCIAL

## 2019-05-28 PROCEDURE — 97530 THERAPEUTIC ACTIVITIES: CPT | Performed by: PHYSICAL THERAPIST

## 2019-05-28 PROCEDURE — 97110 THERAPEUTIC EXERCISES: CPT | Performed by: PHYSICAL THERAPIST

## 2019-05-28 NOTE — FLOWSHEET NOTE
[place] , [facility] DR JOSR LYLES                                                         Physical Therapy Daily Treatment Note  Date:  2019    Patient Name:  Bang Sahu    :  1970  MRN: [id number]    Medical/Treatment Diagnosis Information:  · Diagnosis: V30.193Z (ICD-10-CM) - Sprain of medial collateral ligament of left knee, initial encounter. MCL grade 2- onset 2019  · Treatment Diagnosis: S83.412A (ICD-10-CM) - Sprain of medial collateral ligament of left knee, initial encounter  Insurance/Certification information:  PT Insurance Information: [facility]  Physician Information:  Referring Practitioner: Maria T Nugent of care signed (Y/N):     Date of Patient follow up with Physician:  prn    Functional Scale:  2019   WOMAC-24%    Progress Note: ?  Yes  ?x  No  Next due by: Visit #10  Or 29 May 2019     Latex Allergy:  ?NO      ? YES  Preferred Language for Healthcare:   ?English       ? other:    Visit # Insurance Allowable   8  through 19     Pain level:  0.5/10    SUBJECTIVE:  Patient states pain has continued to linger after rest.  Patient notes that knee feels fine throughout day as long as he keeps moving. Patient states pain is worse when laying in bed and moving LLE. Patient states pain has not bothered him at night the last 3 nights.     OBJECTIVE: 19    Observation:    Test measurements:      Flexibility L R Comment   Hamstring      Gastroc      ITB      Quad                ROM PROM AROM Overpressure Comment    L R L R L R    Flexion   131       Extension   0                               Strength L R Comment   Quad      Hamstring      Gastroc      Hip flexor      Hip ABD                      Special Test Results/Comment   Meniscal Click    Crepitus    Flexion Test    Valgus Laxity    Varus Laxity    Lachmans    Drop Back    Homans            Girth L R   Mid Patella     Suprapatellar 5cm above     15cm above             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    Exercises/Interventions:     Exercise/Equipment Resistance/Repetitions Other comments   Stretching  All tx but G/S stretch out of brace   Hamstring 5x:30    Hip Flexion     ITB     Grion     Quad 5x:30    Inclined Calf 5x:30 5/28   Towel Pull          SLR     Supine 3x10 2# 5/28   Prone     Abduction 3x10 2# 5/28   Adducton 3x10 2# 5/28   SLR+ 3x:20 5/28   Clams 3x10 5# 5/28                  Isometrics     Quad sets     Hip Adduction     Hamstring                    Patellar Glides     Medial     Superior     Inferior          ROM     Sheet Pulls     Wall Slides     Edge of bed     Flexionator     Extensionator     Hang Weights     Ankle Pumps                              CKC     Calf raises 3x10 SL 5/28   Wall sits 5x:30 5/28   Step ups     Step up and over     Lateral Step Downs               Mini squats 3x10 5/28   CC TKE 3x10 5 pl 5/28        Lateral band walks     Side Planks     Half moon     Single leg flow          Biodex-balance     Single leg stance 5x:30 5/28   Plyoback          Stool scoots     SB bridge     SB HS Curls     Planks          PRE     Extension 3x10 35# BLE RANGE: 90/40 5/28   Flexion 3x10 35# SL RANGE: 0/90 5/28        Cable Column          Leg Press 3x10 80# SL RANGE: 70/10 5/28        Bike 8' L6 5/28   Treadmill                     Therapeutic Exercise and NMR EXR  ? (07854) Provided verbal/tactile cueing for activities related to strengthening, flexibility, endurance, ROM for improvements in LE, proximal hip, and core control with self care, mobility, lifting, ambulation. ? (72379) Provided verbal/tactile cueing for activities related to improving balance, coordination, kinesthetic sense, posture, motor skill, proprioception  to assist with LE, proximal hip, and core control in self care, mobility, lifting, ambulation and eccentric single leg control.      NMR and Therapeutic Activities:    ? (39812 or ) Provided verbal/tactile cueing for activities related to improving balance, coordination, kinesthetic sense, posture, motor skill, proprioception and motor activation to allow for proper function of core, proximal hip and LE with self care and ADLs  ? (01380) Gait Re-education- Provided training and instruction to the patient for proper LE, core and proximal hip recruitment and positioning and eccentric body weight control with ambulation re-education including up and down stairs     Home Exercise Program:    ? (74978) Reviewed/Progressed HEP activities related to strengthening, flexibility, endurance, ROM of core, proximal hip and LE for functional self-care, mobility, lifting and ambulation/stair navigation   ? (56154)Reviewed/Progressed HEP activities related to improving balance, coordination, kinesthetic sense, posture, motor skill, proprioception of core, proximal hip and LE for self care, mobility, lifting, and ambulation/stair navigation      Manual Treatments:  PROM / STM / Oscillations-Mobs:  G-I, II, III, IV (PA's, Inf., Post.)  ? (97333) Provided manual therapy to mobilize LE, proximal hip and/or LS spine soft tissue/joints for the purpose of modulating pain, promoting relaxation,  increasing ROM, reducing/eliminating soft tissue swelling/inflammation/restriction, improving soft tissue extensibility and allowing for proper ROM for normal function with self care, mobility, lifting and ambulation. Other:        Modalities:  declined    Charges:  [address]  Timed Code Treatment Minutes: 53   Total Treatment Minutes: 60       ? EVAL (LOW) 24666   ? EVAL (MOD) 60402   ? EVAL (HIGH) 43744   ? RE-EVAL   ? xTE(99521)x 3 (8083-9487-51')   ? IONTO  ? NMR (23641) x   ? VASO  ? Manual (58508) x      ? Other:  ? xTA x   1 (7387-5999-95')   ? [facility] Traction (11209)  ? ES(attended) (46887)      ?  ES (un) (07394):       GOALS:  Patient stated goal: no pain     Therapist goals for Patient:   Short Term Discharge    Electronically signed by:Juan Howell, student physical therapist  Therapist was present, directed the patient's care, made skilled judgement, and was responsible for assessment and treatment of the patient.     Connor Felix PT, DPT [id number]

## 2019-05-30 ENCOUNTER — HOSPITAL ENCOUNTER (OUTPATIENT)
Dept: PHYSICAL THERAPY | Age: 49
Setting detail: THERAPIES SERIES
Discharge: HOME | End: 2019-05-30
Payer: COMMERCIAL

## 2019-05-30 PROCEDURE — 97530 THERAPEUTIC ACTIVITIES: CPT | Performed by: PHYSICAL THERAPIST

## 2019-05-30 PROCEDURE — 97110 THERAPEUTIC EXERCISES: CPT | Performed by: PHYSICAL THERAPIST

## 2019-05-30 NOTE — DISCHARGE SUMMARY
[place] , [facility] [address] JOSR LYLES                                                        Physical Therapy Discharge Summary    Dear Dr. Carolyn Enriquez,    We had the pleasure of treating the following patient for physical therapy services at [address] [place] [address]. A summary of our findings can be found in the discharge summary below. If you have any questions or concerns regarding these findings, please do not hesitate to contact me at the office phone number checked above. Thank you for the referral.     Physician Signature:________________________________Date:__________________  By signing above (or electronic signature), therapists plan is approved by physician      Overall Response to Treatment:   []Patient is responding well to treatment and improvement is noted with regards  to goals   []Patient should continue to improve in reasonable time if they continue HEP   []Patient has plateaued and is no longer responding to skilled PT intervention    []Patient is getting worse and would benefit from return to referring MD   []Patient unable to adhere to initial POC   [x]Other: Patient continues to respond well to treatment with improvement in strength, ROM, balance, and functional mobility. However, pt continues to have pain with when getting out of bed and prolonged sitting. Patient able to meet all previously set goals at this date. Therefore, patient is being d/c home and educated to continue HEP. Date range of Visits: 19-19    Total Visits: 9    Physical Therapy Daily Treatment Note  Date:  2019    Patient Name:  Theresa Monterroso    :  1970  MRN: [id number]    Medical/Treatment Diagnosis Information:  · Diagnosis: V09.604K (ICD-10-CM) - Sprain of medial collateral ligament of left knee, initial encounter.   MCL grade 2- onset 2019  · Treatment Diagnosis: S83.412A (ICD-10-CM) - Sprain of medial collateral ligament of left knee, initial encounter  Insurance/Certification information:  PT Insurance Information: [facility]  Physician Information:  Referring Practitioner: Ivan Mcdonald of care signed (Y/N):     Date of Patient follow up with Physician:  prn    Functional Scale:  5/1/2019   WOMAC-12.5%    Progress Note: ?  Yes  ?x  No       Latex Allergy:  ?NO      ? YES  Preferred Language for Healthcare:   ?English       ? other:    Visit # Insurance Allowable   9 12 through 5/31/19     Pain level:  0.5/10    SUBJECTIVE:  Patient states pain has continued to linger after rest.  Patient notes that knee feels fine throughout day as long as he exercises. Patient able to tolerate more activity without use of brace.      OBJECTIVE: 5/21/19    Observation:    Test measurements:      Flexibility L R Comment   Hamstring      Gastroc      ITB      Quad                ROM PROM AROM Overpressure Comment    L R L R L R    Flexion   140 (no pain)       Extension   0                               Strength L R Comment   Quad 5 5    Hamstring 5 5    Gastroc      Hip flexor 5 5    Hip ABD 5 5                    Special Test Results/Comment   Meniscal Click    Crepitus    Flexion Test    Valgus Laxity    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    Exercises/Interventions:     Exercise/Equipment Resistance/Repetitions Other comments   Stretching  All tx but G/S stretch out of brace   Hamstring 5x:30    Hip Flexion     ITB     Grion     Quad 5x:30    Inclined Calf 5x:30 5/28   Towel Pull          SLR     Supine 3x10 2# 5/28   Prone     Abduction 3x10 2# 5/28   Adducton 3x10 2# 5/28   SLR+ 3x:30  5/28   Clams 3x10 5# 5/28                  Isometrics     Quad sets     Hip Adduction     Hamstring                    Patellar Glides     Medial     Superior     Inferior          ROM Sheet Pulls     Wall Slides     Edge of bed     Flexionator     Extensionator     Hang Weights     Ankle Pumps                              CKC     Calf raises 3x10 SL 5/28   Wall sits 5x:30 5/28   Step ups     Step up and over     Lateral Step Downs               Mini squats 3x10 5/28   CC TKE 3x10 5 pl 5/28        Lateral band walks     Side Planks     Half moon     Single leg flow          Biodex-balance     Single leg stance Plyoback          Stool scoots     SB bridge     SB HS Curls     Planks          PRE     Extension 3x10 35# BLE RANGE: 90/40 5/28   Flexion 3x10 35# SL RANGE: 0/90 5/28        Cable Column          Leg Press 3x10 80# SL RANGE: 70/10 5/28        Bike 8' L6 5/28   Treadmill                     Therapeutic Exercise and NMR EXR  ? (31121) Provided verbal/tactile cueing for activities related to strengthening, flexibility, endurance, ROM for improvements in LE, proximal hip, and core control with self care, mobility, lifting, ambulation. ? (20834) Provided verbal/tactile cueing for activities related to improving balance, coordination, kinesthetic sense, posture, motor skill, proprioception  to assist with LE, proximal hip, and core control in self care, mobility, lifting, ambulation and eccentric single leg control.      NMR and Therapeutic Activities:    ? (71131 or 28391) Provided verbal/tactile cueing for activities related to improving balance, coordination, kinesthetic sense, posture, motor skill, proprioception and motor activation to allow for proper function of core, proximal hip and LE with self care and ADLs  ? (01039) Gait Re-education- Provided training and instruction to the patient for proper LE, core and proximal hip recruitment and positioning and eccentric body weight control with ambulation re-education including up and down stairs     Home Exercise Program:    ? (12236) Reviewed/Progressed HEP activities related to strengthening, flexibility, endurance, ROM of core, proximal -Met  3. Patient will demonstrate an increase in strength of hip flex, ABD, and knee flex/ext to 4+/5 to allow for proper functional mobility as indicated by patients functional deficits. -Met  4. Patient will return to full work duty without pain. -Met  5. Pt will return to playing golf without c/o pain. - Not addressed         Progression Towards Functional goals:  ? Patient is progressing as expected towards functional goals listed. ? Progression is slowed due to complexities listed. ? Progression has been slowed due to co-morbidities. ? Plan just implemented, too soon to assess goals progression  ? Other:     ASSESSMENT:      Treatment/Activity Tolerance:  ? Patient tolerated treatment well ? Patient limited by fatigue  ? Patient limited by pain  ? Patient limited by other medical complications  ? Other: Patient continues to respond well to treatment with improvement in strength, ROM, balance, and functional mobility. Patient able to meet all previously set goals on this date. Patient recommended to d/c home and to continue HEP.      Prognosis: ? Good ? Fair  ? Poor    Patient Requires Follow-up: ? Yes  ? No    PLAN: Pt's visits  tomorrow, therefore being D/C. Plans to follow-up with MD next week for continued pain . ? Continue per plan of care ? Alter current plan (see comments)   Plan of care initiated ? Hold pending MD visit X Discharge    Electronically signed by:Juan Trujillo, student physical therapist  Therapist was present, directed the patient's care, made skilled judgement, and was responsible for assessment and treatment of the patient.     Aliya Ornelas PT, DPT [id number]

## 2019-06-03 ENCOUNTER — OFFICE VISIT (OUTPATIENT)
Dept: ORTHOPEDIC SURGERY | Age: 49
End: 2019-06-03
Payer: COMMERCIAL

## 2019-06-03 VITALS
HEART RATE: 76 BPM | SYSTOLIC BLOOD PRESSURE: 131 MMHG | WEIGHT: 220 LBS | BODY MASS INDEX: 33.34 KG/M2 | DIASTOLIC BLOOD PRESSURE: 94 MMHG | HEIGHT: 68 IN

## 2019-06-03 DIAGNOSIS — S83.412D SPRAIN OF MEDIAL COLLATERAL LIGAMENT OF LEFT KNEE, SUBSEQUENT ENCOUNTER: Primary | ICD-10-CM

## 2019-06-03 PROCEDURE — 99212 OFFICE O/P EST SF 10 MIN: CPT | Performed by: ORTHOPAEDIC SURGERY

## 2019-06-03 NOTE — PROGRESS NOTES
Heather Laureano returns today for his left knee. He typically feels normal but occasionally gets some discomfort. Pain can be 1 or 2 out of 10 if he has pain. He says he is at least 90-95% of normal.  He golfed this weekend without difficulty. He still notices some discomfort at night that feels like someone is punching him in the knee or he has a bruise. Patient's medications, allergies, past medical, surgical, social and family histories were reviewed and updated as appropriate. Relevant review of systems reviewed. General Exam:    Vitals: Blood pressure (!) 131/94, pulse 76, height 5' 8\" (1.727 m), weight 220 lb (99.8 kg). Constitutional: Patient is adequately groomed with no evidence of malnutrition  Mental Status: The patient is oriented to time, place and person. The patient's mood and affect are appropriate. Walks with a normal gait. Right knee is normal.    Left knee has no pain in flexion or extension. He has normal motion. He is ligament is stable. There is no opening over the MCL. He has no joint line pain. He has no effusion. Assessment: Left MCL continues to improve. Plan: He may occasionally get discomfort intermittently. He should wear the brace if it makes him feel better. Follow-up with me on an as-needed basis. This note was created using voice recognition software. It has been proofread, but occasionally errors remain. Please disregard these errors. They will be corrected as they are noted.

## 2019-12-12 ENCOUNTER — OFFICE VISIT (OUTPATIENT)
Dept: PRIMARY CARE CLINIC | Age: 49
End: 2019-12-12
Payer: COMMERCIAL

## 2019-12-12 VITALS
SYSTOLIC BLOOD PRESSURE: 116 MMHG | HEIGHT: 68 IN | DIASTOLIC BLOOD PRESSURE: 74 MMHG | WEIGHT: 194 LBS | BODY MASS INDEX: 29.4 KG/M2 | HEART RATE: 85 BPM

## 2019-12-12 DIAGNOSIS — Z13.220 SCREENING CHOLESTEROL LEVEL: ICD-10-CM

## 2019-12-12 DIAGNOSIS — R73.09 ELEVATED GLUCOSE: ICD-10-CM

## 2019-12-12 DIAGNOSIS — Z00.00 WELL ADULT EXAM: Primary | ICD-10-CM

## 2019-12-12 DIAGNOSIS — Z23 NEED FOR VACCINATION: ICD-10-CM

## 2019-12-12 DIAGNOSIS — M79.672 LEFT FOOT PAIN: ICD-10-CM

## 2019-12-12 LAB
A/G RATIO: 1.9 (ref 1.1–2.2)
ALBUMIN SERPL-MCNC: 4.9 G/DL (ref 3.4–5)
ALP BLD-CCNC: 134 U/L (ref 40–129)
ALT SERPL-CCNC: 10 U/L (ref 10–40)
ANION GAP SERPL CALCULATED.3IONS-SCNC: 19 MMOL/L (ref 3–16)
AST SERPL-CCNC: 14 U/L (ref 15–37)
BILIRUB SERPL-MCNC: 2.6 MG/DL (ref 0–1)
BUN BLDV-MCNC: 21 MG/DL (ref 7–20)
CALCIUM SERPL-MCNC: 9.8 MG/DL (ref 8.3–10.6)
CHLORIDE BLD-SCNC: 99 MMOL/L (ref 99–110)
CHOLESTEROL, TOTAL: 141 MG/DL (ref 0–199)
CO2: 24 MMOL/L (ref 21–32)
CREAT SERPL-MCNC: 0.7 MG/DL (ref 0.9–1.3)
GFR AFRICAN AMERICAN: >60
GFR NON-AFRICAN AMERICAN: >60
GLOBULIN: 2.6 G/DL
GLUCOSE BLD-MCNC: 90 MG/DL (ref 70–99)
HBA1C MFR BLD: 5 %
HDLC SERPL-MCNC: 45 MG/DL (ref 40–60)
LDL CHOLESTEROL CALCULATED: 75 MG/DL
POTASSIUM SERPL-SCNC: 4.2 MMOL/L (ref 3.5–5.1)
SODIUM BLD-SCNC: 142 MMOL/L (ref 136–145)
TOTAL PROTEIN: 7.5 G/DL (ref 6.4–8.2)
TRIGL SERPL-MCNC: 107 MG/DL (ref 0–150)
VLDLC SERPL CALC-MCNC: 21 MG/DL

## 2019-12-12 PROCEDURE — 99396 PREV VISIT EST AGE 40-64: CPT | Performed by: FAMILY MEDICINE

## 2019-12-12 PROCEDURE — 83036 HEMOGLOBIN GLYCOSYLATED A1C: CPT | Performed by: FAMILY MEDICINE

## 2019-12-12 ASSESSMENT — ENCOUNTER SYMPTOMS
SORE THROAT: 0
ABDOMINAL PAIN: 0
RHINORRHEA: 0
COLOR CHANGE: 0
NAUSEA: 1
SHORTNESS OF BREATH: 0
VOMITING: 0
CONSTIPATION: 0
EYE PAIN: 0
COUGH: 0
DIARRHEA: 0

## 2019-12-13 PROCEDURE — 90686 IIV4 VACC NO PRSV 0.5 ML IM: CPT | Performed by: FAMILY MEDICINE

## 2019-12-13 PROCEDURE — 90471 IMMUNIZATION ADMIN: CPT | Performed by: FAMILY MEDICINE

## 2020-01-08 ENCOUNTER — OFFICE VISIT (OUTPATIENT)
Dept: ORTHOPEDIC SURGERY | Age: 50
End: 2020-01-08
Payer: COMMERCIAL

## 2020-01-08 VITALS
HEART RATE: 76 BPM | SYSTOLIC BLOOD PRESSURE: 129 MMHG | RESPIRATION RATE: 16 BRPM | BODY MASS INDEX: 29.4 KG/M2 | WEIGHT: 194 LBS | HEIGHT: 68 IN | DIASTOLIC BLOOD PRESSURE: 87 MMHG

## 2020-01-08 PROCEDURE — L1902 AFO ANKLE GAUNTLET PRE OTS: HCPCS | Performed by: ORTHOPAEDIC SURGERY

## 2020-01-08 PROCEDURE — 99243 OFF/OP CNSLTJ NEW/EST LOW 30: CPT | Performed by: ORTHOPAEDIC SURGERY

## 2020-01-08 RX ORDER — NAPROXEN 500 MG/1
500 TABLET ORAL 2 TIMES DAILY WITH MEALS
Qty: 60 TABLET | Refills: 0 | Status: SHIPPED | OUTPATIENT
Start: 2020-01-08 | End: 2020-02-07

## 2020-01-12 PROBLEM — M76.829 PTTD (POSTERIOR TIBIAL TENDON DYSFUNCTION): Status: ACTIVE | Noted: 2020-01-12

## 2020-01-12 NOTE — PROGRESS NOTES
CHIEF COMPLAINT: Left medial midfoot pain/ insertional posterior tibial tendenosis. HISTORY:  Mr. Familia Reyez 52 y.o.  male presents today for consultation request from Navin Apodaca MD for evaluation of left posterior-medial ankle pain which started March 2019 after he fell off a deck at work.  He is complaining of sharp  pain. Pain is increase with standing and walking and shoe wear. Pain is sharp with first few steps, dull achy pain by the end of the day. The pain radiates to medial leg, and no numbness and tingling sensation. No other complaint.       Past Medical History:   Diagnosis Date    Low HDL (under 40)     Obesity 3/24/2014    Urinary calculus 5/31/2011       Past Surgical History:   Procedure Laterality Date    LITHOTRIPSY  6/8/11    with stenting: Dr. Tracy Cordero History     Socioeconomic History    Marital status: Single     Spouse name: Not on file    Number of children: 0    Years of education: Not on file    Highest education level: Not on file   Occupational History    Occupation:       Comment: [employer]   Social Needs    Financial resource strain: Not on file    Food insecurity:     Worry: Not on file     Inability: Not on file    Transportation needs:     Medical: Not on file     Non-medical: Not on file   Tobacco Use    Smoking status: Never Smoker    Smokeless tobacco: Never Used   Substance and Sexual Activity    Alcohol use: Yes     Comment: infrequently    Drug use: Never    Sexual activity: Not Currently     Partners: Female   Lifestyle    Physical activity:     Days per week: Not on file     Minutes per session: Not on file    Stress: Not on file   Relationships    Social connections:     Talks on phone: Not on file     Gets together: Not on file     Attends Faith service: Not on file     Active member of club or organization: Not on file     Attends meetings of clubs or organizations: Not on file     Relationship status: Not on planus, with 10% of talar head uncovered. IMPRESSION: Left medial midfoot pain/ insertional posterior tibial tendenosis. PLAN: I discussed with the patient all the treatment options, and natural history of PTTD. We recommended stretching exercises of the calf which was taught to the patient today. He will take NSAIDS Naprosyn. I believe he will benefit from PTTD brace, and that was applied in the office today and instructed him in care. We discussed the risk of progression. We will see him  back in 6 weeks and may consider PT/ UCBL. Procedures    Aircast Airlift PTTD Ankle Brace     Patient was prescribed an Aircast Airlift PTTD Brace. The left ankle will require stabilization / immobilization from this semi-rigid / rigid orthosis to improve their function. The orthosis will assist in protecting the affected area, provide functional support and facilitate healing. Patient was instructed to progress ambulation weight bearing as tolerated in the device. The patient was educated and fit by a healthcare professional with expert knowledge and specialization in brace application while under the direct supervision of the treating physician. Verbal and written instructions for the use of and application of this item were provided. They were instructed to contact the office immediately should the brace result in increased pain, decreased sensation, increased swelling or worsening of the condition. Thank you very much for the kind consultation and allowing me to participate in this patient's care. I will continue to keep you apprised of his progress.         Jewel Brown MD

## 2020-02-19 ENCOUNTER — OFFICE VISIT (OUTPATIENT)
Dept: ORTHOPEDIC SURGERY | Age: 50
End: 2020-02-19
Payer: COMMERCIAL

## 2020-02-19 VITALS — BODY MASS INDEX: 29.4 KG/M2 | HEART RATE: 76 BPM | RESPIRATION RATE: 16 BRPM | WEIGHT: 194 LBS | HEIGHT: 68 IN

## 2020-02-19 PROCEDURE — 99213 OFFICE O/P EST LOW 20 MIN: CPT | Performed by: ORTHOPAEDIC SURGERY

## 2020-02-19 RX ORDER — DEXAMETHASONE SODIUM PHOSPHATE 4 MG/ML
INJECTION, SOLUTION INTRA-ARTICULAR; INTRALESIONAL; INTRAMUSCULAR; INTRAVENOUS; SOFT TISSUE
Qty: 30 ML | Refills: 0 | Status: SHIPPED | OUTPATIENT
Start: 2020-02-19

## 2020-02-19 NOTE — PROGRESS NOTES
CHIEF COMPLAINT: Left medial midfoot pain/ insertional posterior tibial tendenosis. HISTORY:  Mr. Samantha Tinsley 52 y.o.  male presents today for follow up of left medial ankle pain. He was initially seen on 1/8/2020 as a consultation request from Gualberto Hernandez MD for evaluation of left posterior-medial ankle pain which started March 2019 after he fell off a deck at work.  He was placed in a posterior tibial tendon brace and last visit and he states that his pain is been improving with the brace. Rates his pain a 1/10 VAS and improving. Pain is increase with push off of the left foot and better with rest and brace. The pain radiates to medial leg, and no numbness and tingling sensation. No other complaint. He works as a .     Past Medical History:   Diagnosis Date    Low HDL (under 40)     Obesity 3/24/2014    Urinary calculus 5/31/2011       Past Surgical History:   Procedure Laterality Date    LITHOTRIPSY  6/8/11    with stenting: Dr. Parul Martinez History     Socioeconomic History    Marital status: Single     Spouse name: Not on file    Number of children: 0    Years of education: Not on file    Highest education level: Not on file   Occupational History    Occupation:       Comment: [employer]   Social Needs    Financial resource strain: Not on file    Food insecurity:     Worry: Not on file     Inability: Not on file    Transportation needs:     Medical: Not on file     Non-medical: Not on file   Tobacco Use    Smoking status: Never Smoker    Smokeless tobacco: Never Used   Substance and Sexual Activity    Alcohol use: Yes     Comment: infrequently    Drug use: Never    Sexual activity: Not Currently     Partners: Female   Lifestyle    Physical activity:     Days per week: Not on file     Minutes per session: Not on file    Stress: Not on file   Relationships    Social connections:     Talks on phone: Not on file     Gets together: Not on file

## 2020-03-03 ENCOUNTER — HOSPITAL ENCOUNTER (OUTPATIENT)
Dept: PHYSICAL THERAPY | Age: 50
Setting detail: THERAPIES SERIES
Discharge: HOME | End: 2020-03-03
Payer: COMMERCIAL

## 2020-03-03 PROCEDURE — 97161 PT EVAL LOW COMPLEX 20 MIN: CPT | Performed by: CHIROPRACTOR

## 2020-03-03 PROCEDURE — 97035 APP MDLTY 1+ULTRASOUND EA 15: CPT | Performed by: CHIROPRACTOR

## 2020-03-03 PROCEDURE — 97530 THERAPEUTIC ACTIVITIES: CPT | Performed by: CHIROPRACTOR

## 2020-03-03 NOTE — FLOWSHEET NOTE
posture, motor skill, proprioception for self-care, mobility, lifting, and ambulation. Therapeutic Activities:    [x] (00152) Provided verbal/tactile cueing to address functional limitations related to loss of mobility, strength, balance, and coordination. Gait Training:  [] (21343) Provided training and instruction to the patient for proper postural muscle recruitment and positioning with ambulation re-education     Home Exercise Program:    [] (96666) Reviewed/Progressed HEP activities related to strengthening, flexibility, endurance, ROM for functional self-care, mobility, lifting and ambulation   [] (44259) Reviewed/Progressed HEP activities related to improving balance, coordination, kinesthetic sense, posture, motor skill, proprioception for self-care, mobility, lifting, and ambulation      Manual Treatments:  MFR / STM / Oscillations-Mobs:  G-I, II, III, IV / Manipulation / MLD  [] (75180) Provided manual therapy to mobilize  soft tissue/joints/fluid for the purpose of modulating pain, promoting relaxation, increasing ROM, reducing/eliminating soft tissue swelling/inflammation/restriction, improving soft tissue extensibility and allowing for proper ROM for normal function with self- care, mobility, lifting and ambulation.         Timed Code Treatment Minutes: 30   Total Treatment Minutes: 45     [x] EVAL (LOW) 85299   [] EVAL (MOD) 81842   [] EVAL (HIGH) 75743   [] RE-EVAL   [] TE (28030) x     [] Aquatic (15517) x  [] NMR (40738)   x  [] Aquatic Group (11961) x  [] Manual (83263) x    [x] Ultrasound (09230) x  [x] TA (89799) x  [] Mech Traction (01029)  [] Ionto (28710)           [] ES (un) (18637):   [] Vasopump (02797) [] Other:      Assessment  [x] Patient tolerated treatment well [] Patient limited by fatigue  [] Patient limited by pain  [] Patient limited by other medical complications  [] Other:     Prognosis: [x] Good [] Fair  [] Poor    Goals:    Short term goals  Time Frame for Short term

## 2020-03-03 NOTE — PROGRESS NOTES
Physical Therapy  Initial Assessment  Date: 3/3/2020  Patient Name: Mario Mcleod  MRN: [id number]  : 1970          Restrictions  Restrictions/Precautions  Restrictions/Precautions: Fall Risk(No fall risk)    Subjective   General  Chart Reviewed: Yes  Family / Caregiver Present: No  Referring Practitioner: Dr. Jakob Claros  Referral Date : 20  Diagnosis: Posterior Tibial Tendon Dysfunction  General Comment  Comments: Originally injured his L knee which caused him to amb in an awkward pattern. Once his knee healed, his foot continued to be painful  PT Visit Information  Onset Date: 19  PT Insurance Information: [employer] (Refugio Lozada)  Total # of Visits Approved: 12  Total # of Visits to Date: 1  Subjective  Subjective: C/o pain on medial aspect and plantar surface of L foot with walking  Pain Screening  Patient Currently in Pain: (1/10)       Objective          PROM RLE (degrees)  RLE PROM: (Adina DF 0-15, PF 0-50)  PROM LLE (degrees)  LLE PROM: (Ankle DF 0-15, PF 0-50)    Strength RLE  Strength RLE: WNL  Strength LLE  Strength LLE: WNL(Except PF 4/5 (painful))        Sensation  Overall Sensation Status: [facility]             Ambulation  Ambulation?: (Amb well without any assistive device)                            Assessment   Conditions Requiring Skilled Therapeutic Intervention  Body structures, Functions, Activity limitations: Decreased strength; Increased pain  Assessment: Prior level of function: Independent with all activity without foot pain  Prognosis: Good  Decision Making: Low Complexity  REQUIRES PT FOLLOW UP: Yes  Activity Tolerance  Activity Tolerance: Patient Tolerated treatment well         Plan   Plan  Times per week: 2x/wk x 6 weeks  Current Treatment Recommendations: Strengthening, Home Exercise Program, Modalities         OutComes Score  LEFS Total Score: 80 (20 1416)                                                        Goals  Short term goals  Time Frame for Short term goals: 3

## 2020-03-10 ENCOUNTER — HOSPITAL ENCOUNTER (OUTPATIENT)
Dept: PHYSICAL THERAPY | Age: 50
Setting detail: THERAPIES SERIES
Discharge: HOME | End: 2020-03-10
Payer: COMMERCIAL

## 2020-03-10 PROCEDURE — 97530 THERAPEUTIC ACTIVITIES: CPT

## 2020-03-10 PROCEDURE — 97110 THERAPEUTIC EXERCISES: CPT

## 2020-03-10 PROCEDURE — 97035 APP MDLTY 1+ULTRASOUND EA 15: CPT

## 2020-03-10 NOTE — FLOWSHEET NOTE
cueing for activities to restore or maintain balance, coordination, kinesthetic sense, posture, motor skill, proprioception for self-care, mobility, lifting, and ambulation. Therapeutic Activities:    [] (89682) Provided verbal/tactile cueing to address functional limitations related to loss of mobility, strength, balance, and coordination. Gait Training:  [] (69215) Provided training and instruction to the patient for proper postural muscle recruitment and positioning with ambulation re-education     Home Exercise Program:    [] (56585) Reviewed/Progressed HEP activities related to strengthening, flexibility, endurance, ROM for functional self-care, mobility, lifting and ambulation   [] (08268) Reviewed/Progressed HEP activities related to improving balance, coordination, kinesthetic sense, posture, motor skill, proprioception for self-care, mobility, lifting, and ambulation      Manual Treatments:  MFR / STM / Oscillations-Mobs:  G-I, II, III, IV / Manipulation / MLD  [] (45910) Provided manual therapy to mobilize  soft tissue/joints/fluid for the purpose of modulating pain, promoting relaxation, increasing ROM, reducing/eliminating soft tissue swelling/inflammation/restriction, improving soft tissue extensibility and allowing for proper ROM for normal function with self- care, mobility, lifting and ambulation.         Timed Code Treatment Minutes: 30   Total Treatment Minutes: 30     [] EVAL (LOW) 92131   [] EVAL (MOD) 98863   [] EVAL (HIGH) 29185   [] RE-EVAL   [x] TE (48183) x     [] Aquatic (07577) x  [] NMR (76914)   x  [] Aquatic Group (75377) x  [] Manual (63340) x    [x] Ultrasound (45622) x  [] TA (79111) x  [] Mech Traction (66145)  [] Ionto (56604)           [] ES (un) (84882):   [] Vasopump (52738) [] Other:      Assessment  [x] Patient tolerated treatment well [] Patient limited by fatigue  [] Patient limited by pain  [] Patient limited by other medical complications  [x] Other: Pt reports foot feeling better after treatment today.     Prognosis: [x] Good [] Fair  [] Poor    Goals:                 Patient Requires Follow-up: [] Yes  [] No    Plan:   [x] Continue per plan of care [] Alter current plan (see comments)  [] Plan of care initiated [] Hold pending MD visit [] Discharge    Plan for Next Session:  Strengthening , US, possibly begin ionto per PT plan    Electronically signed by:  Cierra Fitzpatrick, PTA [id number]

## 2020-03-24 ENCOUNTER — APPOINTMENT (OUTPATIENT)
Dept: PHYSICAL THERAPY | Age: 50
End: 2020-03-24
Payer: COMMERCIAL

## 2020-03-26 ENCOUNTER — APPOINTMENT (OUTPATIENT)
Dept: PHYSICAL THERAPY | Age: 50
End: 2020-03-26
Payer: COMMERCIAL

## 2020-03-31 ENCOUNTER — APPOINTMENT (OUTPATIENT)
Dept: PHYSICAL THERAPY | Age: 50
End: 2020-03-31
Payer: COMMERCIAL

## 2021-01-22 ENCOUNTER — OFFICE VISIT (OUTPATIENT)
Dept: ORTHOPEDIC SURGERY | Age: 51
End: 2021-01-22
Payer: COMMERCIAL

## 2021-01-22 VITALS — TEMPERATURE: 98.2 F | HEIGHT: 68 IN | WEIGHT: 194 LBS | BODY MASS INDEX: 29.4 KG/M2

## 2021-01-22 DIAGNOSIS — M76.829 PTTD (POSTERIOR TIBIAL TENDON DYSFUNCTION): ICD-10-CM

## 2021-01-22 DIAGNOSIS — M67.88 ACHILLES TENDINOSIS: Primary | ICD-10-CM

## 2021-01-22 PROCEDURE — 99214 OFFICE O/P EST MOD 30 MIN: CPT | Performed by: ORTHOPAEDIC SURGERY

## 2021-01-22 RX ORDER — NAPROXEN 500 MG/1
500 TABLET ORAL 2 TIMES DAILY WITH MEALS
Qty: 60 TABLET | Refills: 0 | Status: SHIPPED | OUTPATIENT
Start: 2021-01-22 | End: 2021-02-21

## 2021-01-23 PROBLEM — M67.88 ACHILLES TENDINOSIS: Status: ACTIVE | Noted: 2021-01-23

## 2021-01-23 NOTE — PROGRESS NOTES
CHIEF COMPLAINT: Left posterior heel pain/ Achillis tendenosis. HISTORY:  Mr. Damian Zacarias 48 y.o.  male presents today for the first visit for evaluation of left posterior heel pain which started fall 2020.  He is complaining of achy pain going up his left Achillis. Pain is increase with standing and walking. Pain is sharp early in the morning with first few steps, dull achy pain by the end of the day. No radiation and no numbness and tingling sensation. No other complaint. 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      Past Medical History:   Diagnosis Date    Low HDL (under 40)     Obesity 3/24/2014    Urinary calculus 5/31/2011       Past Surgical History:   Procedure Laterality Date    LITHOTRIPSY  6/8/11    with stenting: Dr. Hassie Schaumann History     Socioeconomic History    Marital status: Single     Spouse name: Not on file    Number of children: 0    Years of education: Not on file    Highest education level: Not on file   Occupational History    Occupation:       Comment: [employer]   Social Needs    Financial resource strain: Not on file    Food insecurity     Worry: Not on file     Inability: Not on file   [employer] needs     Medical: Not on file     Non-medical: Not on file   Tobacco Use    Smoking status: Never Smoker    Smokeless tobacco: Never Used   Substance and Sexual Activity    Alcohol use: Yes     Comment: infrequently    Drug use: Never    Sexual activity: Not Currently     Partners: Female   Lifestyle    Physical activity     Days per week: Not on file     Minutes per session: Not on file    Stress: Not on file   Relationships    Social connections     Talks on phone: Not on file     Gets together: Not on file     Attends Synagogue service: Not on file     Active member of club or organization: Not on file     Attends meetings of clubs or organizations: Not on file Relationship status: Not on file    Intimate partner violence     Fear of current or ex partner: Not on file     Emotionally abused: Not on file     Physically abused: Not on file     Forced sexual activity: Not on file   Other Topics Concern    Not on file   Social History Narrative    ** Merged History Encounter **            Family History   Problem Relation Age of Onset    Cancer Mother         Ovarian    Diabetes Father         Type ll    Heart Disease Father     Lung Cancer Father     Hypertension Father        Current Outpatient Medications on File Prior to Visit   Medication Sig Dispense Refill    dexamethasone (DECADRON) 4 MG/ML injection TO BE USED WITH PHYSICAL THERAPY (Patient not taking: Reported on 1/22/2021) 30 mL 0    naproxen (NAPROSYN) 500 MG tablet Take 1 tablet by mouth 2 times daily (with meals) 60 tablet 0    naproxen (NAPROSYN) 500 MG tablet Take 1 tablet by mouth 2 times daily (with meals) 60 tablet 2     No current facility-administered medications on file prior to visit. Pertinent items are noted in HPI  Review of systems reviewed from Patient History Form dated on 1/22/2021 and available in the patient's chart under the Media tab. No change noted. PHYSICAL EXAMINATION:  Mr. Tamara Thomson is a very pleasant 48 y.o.  male who presents today in no acute distress, awake, alert, and oriented. He is well dressed, nourished and  groomed. Patient with normal affect. Height is  5' 8\" (1.727 m), weight is 194 lb (88 kg), Body mass index is 29.5 kg/m². Resting respiratory rate is 16. Examination of the gait, showed that the patient walks heel-toe with a non-antalgic gait and no limp.  Examination of both ankles showing a good range of motion.  He has dorsiflexion to about 5 degrees bilaterally, which increased with knee flexion.  He has intact sensation and good pedal pulses.  He has good strength in all four planes, including eversion, and has tenderness on deep palpation over the left posterior calcaneal tubercle and Achillis compared to the other side.  The ankles are stable to drawer test bilaterally, equally.      IMAGING:  Xray's were reviewed.  3 views of the left foot and ankle taken in office today, and showed no acute fracture. Norma's deformity. IMPRESSION: Left posterior heel pain/ Achillis tendenosis. PLAN:  I assured the patient that the xray is negative for acute fracture. I discussed with the patient the treatment options. We recommended stretching exercises of the calf which was taught to the patient today. He will take NSAIDS Naprosyn. F/u in 6 weeks, PT if needed.       Ashley Rahman MD

## 2021-02-17 DIAGNOSIS — M67.88 ACHILLES TENDINOSIS: ICD-10-CM

## 2021-02-17 DIAGNOSIS — M76.829 PTTD (POSTERIOR TIBIAL TENDON DYSFUNCTION): ICD-10-CM

## 2021-02-17 RX ORDER — NAPROXEN 500 MG/1
TABLET ORAL
Qty: 60 TABLET | Refills: 0 | OUTPATIENT
Start: 2021-02-17

## 2021-03-05 ENCOUNTER — OFFICE VISIT (OUTPATIENT)
Dept: ORTHOPEDIC SURGERY | Age: 51
End: 2021-03-05
Payer: COMMERCIAL

## 2021-03-05 VITALS
HEART RATE: 84 BPM | BODY MASS INDEX: 29.4 KG/M2 | DIASTOLIC BLOOD PRESSURE: 86 MMHG | TEMPERATURE: 97.6 F | SYSTOLIC BLOOD PRESSURE: 138 MMHG | WEIGHT: 194 LBS | HEIGHT: 68 IN

## 2021-03-05 DIAGNOSIS — M67.88 ACHILLES TENDINOSIS: ICD-10-CM

## 2021-03-05 DIAGNOSIS — M76.829 PTTD (POSTERIOR TIBIAL TENDON DYSFUNCTION): Primary | ICD-10-CM

## 2021-03-05 PROCEDURE — 99214 OFFICE O/P EST MOD 30 MIN: CPT | Performed by: ORTHOPAEDIC SURGERY

## 2021-03-05 NOTE — PROGRESS NOTES
CHIEF COMPLAINT:   1- Left posterior heel pain/ Achillis tendenosis. 2- Left medial midfoot pain/ insertional posterior tibial tendenosis. HISTORY:  Mr. Amirah Bird 48 y.o.  male presents today for f/u evaluation of left posterior heel pain which started fall 2020 that is better but still c/o left posterior-medial ankle pain which started March 2019 after he fell off a deck at work.  He was placed in a posterior tibial tendon brace 1/8/2020 visit and he states that his pain is been improving with the brace.  He is complaining of achy pain going up his left Achillis. Pain is increase with standing and walking. Pain is sharp early in the morning with first few steps, dull achy pain by the end of the day. No radiation and no numbness and tingling sensation. No other complaint. 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      Past Medical History:   Diagnosis Date    Low HDL (under 40)     Obesity 3/24/2014    Urinary calculus 5/31/2011       Past Surgical History:   Procedure Laterality Date    LITHOTRIPSY  6/8/11    with stenting: Dr. Taylor Tate History     Socioeconomic History    Marital status: Single     Spouse name: Not on file    Number of children: 0    Years of education: Not on file    Highest education level: Not on file   Occupational History    Occupation:       Comment: [employer]   Social Needs    Financial resource strain: Not on file    Food insecurity     Worry: Not on file     Inability: Not on file   [employer] needs     Medical: Not on file     Non-medical: Not on file   Tobacco Use    Smoking status: Never Smoker    Smokeless tobacco: Never Used   Substance and Sexual Activity    Alcohol use: Yes     Comment: infrequently    Drug use: Never    Sexual activity: Not Currently     Partners: Female   Lifestyle    Physical activity     Days per week: Not on file     Minutes per session: Not on file    Stress: Not on file   Relationships    Social connections     Talks on phone: Not on file     Gets together: Not on file     Attends Shinto service: Not on file     Active member of club or organization: Not on file     Attends meetings of clubs or organizations: Not on file     Relationship status: Not on file    Intimate partner violence     Fear of current or ex partner: Not on file     Emotionally abused: Not on file     Physically abused: Not on file     Forced sexual activity: Not on file   Other Topics Concern    Not on file   Social History Narrative    ** Merged History Encounter **            Family History   Problem Relation Age of Onset    Cancer Mother         Ovarian    Diabetes Father         Type ll    Heart Disease Father     Lung Cancer Father     Hypertension Father        Current Outpatient Medications on File Prior to Visit   Medication Sig Dispense Refill    naproxen (NAPROSYN) 500 MG tablet Take 1 tablet by mouth 2 times daily (with meals) 60 tablet 0    dexamethasone (DECADRON) 4 MG/ML injection TO BE USED WITH PHYSICAL THERAPY (Patient not taking: Reported on 1/22/2021) 30 mL 0    naproxen (NAPROSYN) 500 MG tablet Take 1 tablet by mouth 2 times daily (with meals) 60 tablet 0    naproxen (NAPROSYN) 500 MG tablet Take 1 tablet by mouth 2 times daily (with meals) 60 tablet 2     No current facility-administered medications on file prior to visit. Pertinent items are noted in HPI  Review of systems reviewed from Patient History Form dated on 1/22/2021 and available in the patient's chart under the Media tab. No change noted. PHYSICAL EXAMINATION:  Mr. Toya Quevedo is a very pleasant 48 y.o.  male who presents today in no acute distress, awake, alert, and oriented. He is well dressed, nourished and  groomed. Patient with normal affect. Height is  5' 8\" (1.727 m), weight is 194 lb (88 kg), Body mass index is 29.5 kg/m².   Resting respiratory rate is 16.     Examination of the gait, showed that the patient walks heel-toe with a non-antalgic gait and no limp.  Examination of both ankles showing a good range of motion.  He has dorsiflexion to about 5 degrees bilaterally, which increased with knee flexion. He has intact sensation and good pedal pulses.  He has good strength in all four planes, including eversion, and has tenderness on deep palpation over the left posterior calcaneal tubercle and Achillis compared to the other side. He has mild tenderness on deep palpation over the insertion of posterior tibial tendon insertion at the navicular with minimal to no swelling compared to the other side.  The ankles are stable to drawer test bilaterally, equally.            IMAGING:  Xray's were reviewed.  3 views of the left foot and ankle taken in office 1/22/2021, and showed no acute fracture. Norma's deformity. Pes planus, with 10% of talar head uncovered. IMPRESSION:    1- Left posterior heel pain/ Achillis tendenosis. 2- Left medial midfoot pain/ insertional posterior tibial tendenosis. PLAN:  I discussed with the patient the treatment options. I discussed with the patient all the treatment options, and natural history of PTTD. We recommended continue stretching exercises of the calf which was taught to the patient today. He will take NSAIDS Naprosyn. I discussed with the patient that I think that he would really benefit from a course of physical therapy for further strengthening and stretching. Rx for bilateral UCBL given. We discussed the risk of progression. F/u in 2 months after UCBL, PT if needed.       Cesar Swift MD

## 2024-11-20 ENCOUNTER — TELEPHONE (OUTPATIENT)
Dept: FAMILY MEDICINE CLINIC | Age: 54
End: 2024-11-20

## 2024-11-20 NOTE — TELEPHONE ENCOUNTER
----- Message from Amber HILL sent at 11/20/2024 12:16 PM EST -----  Regarding: [facility] Appointment Request  [facility] Appointment Request    Patient needs appointment for [facility] Appointment Type: New Patient.    Patient Requested Dates(s): Any time   Patient Requested Time: Any time   Provider Name: N/A    Reason for Appointment Request: New Patient - Requested Provider unavailable // Pt wanted to get establish care with Kevin Armendariz MD.   --------------------------------------------------------------------------------------------------------------------------    Relationship to Patient: Self     Call Back Information: OK to leave message on voicemail  Preferred Call Back Number: Phone [phone]

## 2024-11-20 NOTE — TELEPHONE ENCOUNTER
Please review if ok to take as a pt, last OV I see for him was in 2019 with [facility] so would be new patient.

## 2024-12-04 ENCOUNTER — OFFICE VISIT (OUTPATIENT)
Dept: FAMILY MEDICINE CLINIC | Age: 54
End: 2024-12-04
Payer: COMMERCIAL

## 2024-12-04 VITALS
BODY MASS INDEX: 35.77 KG/M2 | HEART RATE: 87 BPM | DIASTOLIC BLOOD PRESSURE: 86 MMHG | RESPIRATION RATE: 18 BRPM | SYSTOLIC BLOOD PRESSURE: 132 MMHG | OXYGEN SATURATION: 98 % | WEIGHT: 236 LBS | HEIGHT: 68 IN

## 2024-12-04 DIAGNOSIS — Z12.5 SCREENING PSA (PROSTATE SPECIFIC ANTIGEN): ICD-10-CM

## 2024-12-04 DIAGNOSIS — Z12.11 SCREEN FOR COLON CANCER: ICD-10-CM

## 2024-12-04 DIAGNOSIS — Z13.220 SCREENING, LIPID: ICD-10-CM

## 2024-12-04 DIAGNOSIS — Z13.1 SCREENING FOR DIABETES MELLITUS: ICD-10-CM

## 2024-12-04 DIAGNOSIS — Z00.00 ROUTINE GENERAL MEDICAL EXAMINATION AT A HEALTH CARE FACILITY: Primary | ICD-10-CM

## 2024-12-04 PROCEDURE — 99386 PREV VISIT NEW AGE 40-64: CPT | Performed by: FAMILY MEDICINE

## 2024-12-04 PROCEDURE — G8484 FLU IMMUNIZE NO ADMIN: HCPCS | Performed by: FAMILY MEDICINE

## 2024-12-04 PROCEDURE — 36415 COLL VENOUS BLD VENIPUNCTURE: CPT | Performed by: FAMILY MEDICINE

## 2024-12-04 SDOH — ECONOMIC STABILITY: INCOME INSECURITY: HOW HARD IS IT FOR YOU TO PAY FOR THE VERY BASICS LIKE FOOD, HOUSING, MEDICAL CARE, AND HEATING?: NOT HARD AT ALL

## 2024-12-04 SDOH — ECONOMIC STABILITY: FOOD INSECURITY: WITHIN THE PAST 12 MONTHS, THE FOOD YOU BOUGHT JUST DIDN'T LAST AND YOU DIDN'T HAVE MONEY TO GET MORE.: NEVER TRUE

## 2024-12-04 SDOH — ECONOMIC STABILITY: FOOD INSECURITY: WITHIN THE PAST 12 MONTHS, YOU WORRIED THAT YOUR FOOD WOULD RUN OUT BEFORE YOU GOT MONEY TO BUY MORE.: NEVER TRUE

## 2024-12-04 ASSESSMENT — PATIENT HEALTH QUESTIONNAIRE - PHQ9
SUM OF ALL RESPONSES TO PHQ QUESTIONS 1-9: 0
2. FEELING DOWN, DEPRESSED OR HOPELESS: NOT AT ALL
SUM OF ALL RESPONSES TO PHQ QUESTIONS 1-9: 0
1. LITTLE INTEREST OR PLEASURE IN DOING THINGS: NOT AT ALL
3. TROUBLE FALLING OR STAYING ASLEEP: NOT AT ALL
6. FEELING BAD ABOUT YOURSELF - OR THAT YOU ARE A FAILURE OR HAVE LET YOURSELF OR YOUR FAMILY DOWN: NOT AT ALL
7. TROUBLE CONCENTRATING ON THINGS, SUCH AS READING THE NEWSPAPER OR WATCHING TELEVISION: NOT AT ALL
4. FEELING TIRED OR HAVING LITTLE ENERGY: NOT AT ALL
SUM OF ALL RESPONSES TO PHQ QUESTIONS 1-9: 0
SUM OF ALL RESPONSES TO PHQ QUESTIONS 1-9: 0
SUM OF ALL RESPONSES TO PHQ9 QUESTIONS 1 & 2: 0
8. MOVING OR SPEAKING SO SLOWLY THAT OTHER PEOPLE COULD HAVE NOTICED. OR THE OPPOSITE, BEING SO FIGETY OR RESTLESS THAT YOU HAVE BEEN MOVING AROUND A LOT MORE THAN USUAL: NOT AT ALL
5. POOR APPETITE OR OVEREATING: NOT AT ALL
10. IF YOU CHECKED OFF ANY PROBLEMS, HOW DIFFICULT HAVE THESE PROBLEMS MADE IT FOR YOU TO DO YOUR WORK, TAKE CARE OF THINGS AT HOME, OR GET ALONG WITH OTHER PEOPLE: NOT DIFFICULT AT ALL
9. THOUGHTS THAT YOU WOULD BE BETTER OFF DEAD, OR OF HURTING YOURSELF: NOT AT ALL

## 2024-12-04 NOTE — PROGRESS NOTES
2024    Jacques Vaughan (:  1970) is a 54 y.o. male, here for evaluation of the following chief complaint(s):  New Patient (Former Dr Mcgowan patient. )      ASSESSMENT/PLAN:     Diagnosis Orders   1. Routine general medical examination at a health care facility        2. Screening PSA (prostate specific antigen)  PSA Screening      3. Screening, lipid  Lipid Panel      4. Screening for diabetes mellitus  Hemoglobin A1C      5. Screen for colon cancer  Amb External Referral To Gastroenterology          No follow-ups on file.    An electronic signature was used to authenticate this note.    SUBJECTIVE/OBJECTIVE:  (NOTE : prior results listed below reviewed at this visit to assist in medical decision making.)    HPI / ROS    # new patient    # Preventive and other issues    # PSA screening history:  No results found for: \"PSA\"    # Lipids - recent screening history:  Lab Results   Component Value Date    CHOL 141 2019    TRIG 107 2019    HDL 45 2019    LDL 75 2019    VLDL 21 2019     The ASCVD Risk score (Tina HUGO, et al., 2019) failed to calculate for the following reasons:    Cannot find a previous HDL lab    Cannot find a previous total cholesterol lab    # Screen for diabetes  Hemoglobin A1C   Date Value Ref Range Status   2019 5.0 % Final       Wt Readings from Last 3 Encounters:   24 107 kg (236 lb)   21 88 kg (194 lb)   21 88 kg (194 lb)       BP Readings from Last 3 Encounters:   24 132/86   21 138/86   20 129/87       PHYSICAL EXAM  Vitals:    24 1016   BP: 132/86   Pulse: 87   Resp: 18   SpO2: 98%   Weight: 107 kg (236 lb)   Height: 1.727 m (5' 8\")     A&o  Neck no TMG no bruit  Car reg no MGR  Lungs cta  Ext no edema  Skin no jaundice  Eyes anicteric

## 2024-12-05 LAB
CHOLEST SERPL-MCNC: 177 MG/DL (ref 0–199)
EST. AVERAGE GLUCOSE BLD GHB EST-MCNC: 105.4 MG/DL
HBA1C MFR BLD: 5.3 %
HDLC SERPL-MCNC: 38 MG/DL (ref 40–60)
LDLC SERPL CALC-MCNC: 106 MG/DL
PSA SERPL DL<=0.01 NG/ML-MCNC: 0.19 NG/ML (ref 0–4)
TRIGL SERPL-MCNC: 165 MG/DL (ref 0–150)
VLDLC SERPL CALC-MCNC: 33 MG/DL

## 2024-12-06 ENCOUNTER — TELEPHONE (OUTPATIENT)
Dept: FAMILY MEDICINE CLINIC | Age: 54
End: 2024-12-06

## 2024-12-06 NOTE — TELEPHONE ENCOUNTER
Pt called in stating that he was told that he would get a cologuard in the mail,  but he said he got a vm message from dr nguyễn office to schedule a colonoscopy and he is confused on what he should do. He can be reached at [phone]

## 2024-12-19 ENCOUNTER — HOSPITAL ENCOUNTER (OUTPATIENT)
Dept: CT IMAGING | Age: 54
Discharge: HOME | End: 2024-12-19
Payer: COMMERCIAL

## 2024-12-19 ENCOUNTER — TELEPHONE (OUTPATIENT)
Dept: FAMILY MEDICINE CLINIC | Age: 54
End: 2024-12-19

## 2024-12-19 ENCOUNTER — OFFICE VISIT (OUTPATIENT)
Dept: FAMILY MEDICINE CLINIC | Age: 54
End: 2024-12-19
Payer: COMMERCIAL

## 2024-12-19 VITALS
DIASTOLIC BLOOD PRESSURE: 80 MMHG | HEART RATE: 93 BPM | HEIGHT: 68 IN | BODY MASS INDEX: 35.92 KG/M2 | TEMPERATURE: 97.9 F | SYSTOLIC BLOOD PRESSURE: 128 MMHG | OXYGEN SATURATION: 97 % | WEIGHT: 237 LBS

## 2024-12-19 DIAGNOSIS — N20.0 NEPHROLITHIASIS: Primary | ICD-10-CM

## 2024-12-19 DIAGNOSIS — R10.9 LEFT FLANK PAIN: ICD-10-CM

## 2024-12-19 DIAGNOSIS — R10.9 LEFT FLANK PAIN: Primary | ICD-10-CM

## 2024-12-19 PROCEDURE — G8417 CALC BMI ABV UP PARAM F/U: HCPCS | Performed by: NURSE PRACTITIONER

## 2024-12-19 PROCEDURE — 3017F COLORECTAL CA SCREEN DOC REV: CPT | Performed by: NURSE PRACTITIONER

## 2024-12-19 PROCEDURE — G8484 FLU IMMUNIZE NO ADMIN: HCPCS | Performed by: NURSE PRACTITIONER

## 2024-12-19 PROCEDURE — G8427 DOCREV CUR MEDS BY ELIG CLIN: HCPCS | Performed by: NURSE PRACTITIONER

## 2024-12-19 PROCEDURE — 99213 OFFICE O/P EST LOW 20 MIN: CPT | Performed by: NURSE PRACTITIONER

## 2024-12-19 PROCEDURE — 74176 CT ABD & PELVIS W/O CONTRAST: CPT

## 2024-12-19 PROCEDURE — 1036F TOBACCO NON-USER: CPT | Performed by: NURSE PRACTITIONER

## 2024-12-19 NOTE — TELEPHONE ENCOUNTER
Please let patient know his CT scan showed 2 kidney stones. A 5 mm moderately obstructing and 2 mm stone in his left ureter. Referral placed to urology.     Dr. Schneider  [phone]    Please have patient call and schedule to be seen asap.     Please document call and then close encounter.  thanks

## 2024-12-19 NOTE — PROGRESS NOTES
PROGRESS NOTE     Ashley Verdin CNP  [facility]  [address] [place] [address], [address]  [place] [address]  [phone] office  [phone] fax    Date of Service:  12/19/2024    Assessment / Plan:     1. Left flank pain  Rule out kidney stone and evaluate sizing to determine if patient can pass or need referral to urology. Pain is tolerable at this time. No fever. Urinalysis normal. Push fluids.     - CT ABDOMEN PELVIS W IV CONTRAST Additional Contrast? None; Future    Subjective:      Patient ID: .Jacques Vaughan is a 54 y.o. male      CC: Possible kidney stone    HPI  Patient started with left flank pain about 1 week ago. It worsened significantly last night around 9 pm. He drank some water and took ibuprofen. It is better today but still 3-4 on pain scale. Radiating to his left groin at times.     History of kidney stone back in 2011. It was 5x8 mm requiring lithotripsy by Dr. Ceja.     Vitals:    12/19/24 1136   BP: 128/80   Site: Left Upper Arm   Position: Sitting   Cuff Size: Large Adult   Pulse: 93   Temp: 97.9 °F (36.6 °C)   SpO2: 97%   Weight: 107.5 kg (237 lb)   Height: 1.727 m (5' 8\")       No outpatient medications have been marked as taking for the 12/19/24 encounter (Office Visit) with Ashley Verdin APRN - CNP.       Past Medical History:   Diagnosis Date    Low HDL (under 40)     Obesity 3/24/2014    Urinary calculus 5/31/2011       Past Surgical History:   Procedure Laterality Date    LITHOTRIPSY  6/8/11    with stenting: Dr. Ceja       Social History     Tobacco Use    Smoking status: Never    Smokeless tobacco: Never   Substance Use Topics    Alcohol use: Yes     Comment: infrequently       Family History   Problem Relation Age of Onset    Cancer Mother         Ovarian    Diabetes Father         Type ll    Heart Disease Father     Lung Cancer Father     Hypertension Father            Review of Systems  Constitutional:  Negative for activity or appetite change, fever or

## 2025-06-02 ENCOUNTER — OFFICE VISIT (OUTPATIENT)
Dept: FAMILY MEDICINE CLINIC | Age: 55
End: 2025-06-02
Payer: COMMERCIAL

## 2025-06-02 VITALS
HEART RATE: 93 BPM | OXYGEN SATURATION: 97 % | BODY MASS INDEX: 35.61 KG/M2 | WEIGHT: 235 LBS | SYSTOLIC BLOOD PRESSURE: 128 MMHG | DIASTOLIC BLOOD PRESSURE: 80 MMHG | HEIGHT: 68 IN

## 2025-06-02 DIAGNOSIS — H65.03 NON-RECURRENT ACUTE SEROUS OTITIS MEDIA OF BOTH EARS: Primary | ICD-10-CM

## 2025-06-02 DIAGNOSIS — R42 DIZZINESS: ICD-10-CM

## 2025-06-02 PROCEDURE — G8417 CALC BMI ABV UP PARAM F/U: HCPCS | Performed by: NURSE PRACTITIONER

## 2025-06-02 PROCEDURE — 1036F TOBACCO NON-USER: CPT | Performed by: NURSE PRACTITIONER

## 2025-06-02 PROCEDURE — G8427 DOCREV CUR MEDS BY ELIG CLIN: HCPCS | Performed by: NURSE PRACTITIONER

## 2025-06-02 PROCEDURE — 99213 OFFICE O/P EST LOW 20 MIN: CPT | Performed by: NURSE PRACTITIONER

## 2025-06-02 PROCEDURE — 3017F COLORECTAL CA SCREEN DOC REV: CPT | Performed by: NURSE PRACTITIONER

## 2025-06-02 RX ORDER — FLUTICASONE PROPIONATE 50 MCG
2 SPRAY, SUSPENSION (ML) NASAL DAILY
Qty: 16 G | Refills: 0 | Status: SHIPPED | OUTPATIENT
Start: 2025-06-02

## 2025-06-02 RX ORDER — MECLIZINE HYDROCHLORIDE 25 MG/1
25 TABLET ORAL 3 TIMES DAILY PRN
Qty: 60 TABLET | Refills: 0 | Status: SHIPPED | OUTPATIENT
Start: 2025-06-02 | End: 2025-07-02

## 2025-06-02 SDOH — ECONOMIC STABILITY: FOOD INSECURITY: WITHIN THE PAST 12 MONTHS, YOU WORRIED THAT YOUR FOOD WOULD RUN OUT BEFORE YOU GOT MONEY TO BUY MORE.: NEVER TRUE

## 2025-06-02 SDOH — ECONOMIC STABILITY: FOOD INSECURITY: WITHIN THE PAST 12 MONTHS, THE FOOD YOU BOUGHT JUST DIDN'T LAST AND YOU DIDN'T HAVE MONEY TO GET MORE.: NEVER TRUE

## 2025-06-02 ASSESSMENT — PATIENT HEALTH QUESTIONNAIRE - PHQ9
2. FEELING DOWN, DEPRESSED OR HOPELESS: NOT AT ALL
8. MOVING OR SPEAKING SO SLOWLY THAT OTHER PEOPLE COULD HAVE NOTICED. OR THE OPPOSITE, BEING SO FIGETY OR RESTLESS THAT YOU HAVE BEEN MOVING AROUND A LOT MORE THAN USUAL: NOT AT ALL
SUM OF ALL RESPONSES TO PHQ QUESTIONS 1-9: 0
7. TROUBLE CONCENTRATING ON THINGS, SUCH AS READING THE NEWSPAPER OR WATCHING TELEVISION: NOT AT ALL
4. FEELING TIRED OR HAVING LITTLE ENERGY: NOT AT ALL
10. IF YOU CHECKED OFF ANY PROBLEMS, HOW DIFFICULT HAVE THESE PROBLEMS MADE IT FOR YOU TO DO YOUR WORK, TAKE CARE OF THINGS AT HOME, OR GET ALONG WITH OTHER PEOPLE: NOT DIFFICULT AT ALL
9. THOUGHTS THAT YOU WOULD BE BETTER OFF DEAD, OR OF HURTING YOURSELF: NOT AT ALL
1. LITTLE INTEREST OR PLEASURE IN DOING THINGS: NOT AT ALL
5. POOR APPETITE OR OVEREATING: NOT AT ALL
SUM OF ALL RESPONSES TO PHQ QUESTIONS 1-9: 0
3. TROUBLE FALLING OR STAYING ASLEEP: NOT AT ALL
SUM OF ALL RESPONSES TO PHQ QUESTIONS 1-9: 0
6. FEELING BAD ABOUT YOURSELF - OR THAT YOU ARE A FAILURE OR HAVE LET YOURSELF OR YOUR FAMILY DOWN: NOT AT ALL
SUM OF ALL RESPONSES TO PHQ QUESTIONS 1-9: 0

## 2025-06-02 NOTE — PROGRESS NOTES
Jacques Vaughan (:  1970) is a 55 y.o. male,Established patient, here for evaluation of the following chief complaint(s):  Dizziness (/Started Friday worse then, has gotten better each day but still dizzy) and Tinnitus (Left ear)         Assessment & Plan  Non-recurrent acute serous otitis media of both ears   - advised to do the flonase daily for about 5 days     Orders:    fluticasone (FLONASE) 50 MCG/ACT nasal spray; 2 sprays by Each Nostril route daily    Dizziness    - take the meclizine as needed for severe flare ups  - follow up of sx do not improve    Orders:    meclizine (ANTIVERT) 25 MG tablet; Take 1 tablet by mouth 3 times daily as needed for Dizziness      Return if symptoms worsen or fail to improve.       Subjective   HPI    Patient presents today for dizziness for about 4 days. States he noticed his sx start with dizziness, balancing issues this past Friday. States some ringing in his left ear as well. Denies any blurred vision, headaches, falling, chest pain, numbness in his face, n/v, passing out. States he did feel a little off balance with his BLE.     Review of Systems   See HPI    Objective   Physical Exam  Vitals and nursing note reviewed.   Constitutional:       Appearance: Normal appearance.   HENT:      Right Ear: A middle ear effusion is present.      Left Ear: A middle ear effusion is present.   Cardiovascular:      Rate and Rhythm: Normal rate and regular rhythm.   Pulmonary:      Effort: Pulmonary effort is normal.      Breath sounds: Normal breath sounds.   Musculoskeletal:         General: Normal range of motion.   Skin:     General: Skin is warm and dry.   Neurological:      General: No focal deficit present.      Mental Status: He is alert and oriented to person, place, and time.      Cranial Nerves: No cranial nerve deficit.      Sensory: No sensory deficit.      Motor: No weakness.            On this date 2025 I have spent 25 minutes reviewing previous notes, test

## 2025-06-29 DIAGNOSIS — H65.03 NON-RECURRENT ACUTE SEROUS OTITIS MEDIA OF BOTH EARS: ICD-10-CM

## 2025-06-30 RX ORDER — FLUTICASONE PROPIONATE 50 MCG
2 SPRAY, SUSPENSION (ML) NASAL DAILY
Qty: 16 ML | Refills: 3 | Status: SHIPPED | OUTPATIENT
Start: 2025-06-30

## 2025-06-30 NOTE — TELEPHONE ENCOUNTER
Medication:   Requested Prescriptions     Pending Prescriptions Disp Refills    fluticasone (FLONASE) 50 MCG/ACT nasal spray [Pharmacy Med Name: FLUTICASONE PROP 50 MCG SPRAY] 16 mL      Sig: SPRAY 2 SPRAYS INTO EACH NOSTRIL EVERY DAY       Last Filled:      Patient Phone Number: [phone] (home)     Last appt: 6/2/2025   Next appt: 12/4/2025  Future Appointments   Date Time Provider Department Center   12/4/2025 10:15 AM Kevin Armendariz MD [facility] DEP